# Patient Record
Sex: MALE | Race: BLACK OR AFRICAN AMERICAN | NOT HISPANIC OR LATINO
[De-identification: names, ages, dates, MRNs, and addresses within clinical notes are randomized per-mention and may not be internally consistent; named-entity substitution may affect disease eponyms.]

---

## 2024-01-10 ENCOUNTER — RESULT CHARGE (OUTPATIENT)
Age: 54
End: 2024-01-10

## 2024-01-11 ENCOUNTER — MED ADMIN CHARGE (OUTPATIENT)
Age: 54
End: 2024-01-11

## 2024-01-11 ENCOUNTER — APPOINTMENT (OUTPATIENT)
Dept: INTERNAL MEDICINE | Facility: CLINIC | Age: 54
End: 2024-01-11
Payer: MEDICAID

## 2024-01-11 ENCOUNTER — LABORATORY RESULT (OUTPATIENT)
Age: 54
End: 2024-01-11

## 2024-01-11 ENCOUNTER — NON-APPOINTMENT (OUTPATIENT)
Age: 54
End: 2024-01-11

## 2024-01-11 ENCOUNTER — OUTPATIENT (OUTPATIENT)
Dept: OUTPATIENT SERVICES | Facility: HOSPITAL | Age: 54
LOS: 1 days | End: 2024-01-11

## 2024-01-11 VITALS
DIASTOLIC BLOOD PRESSURE: 98 MMHG | WEIGHT: 192 LBS | HEIGHT: 66.54 IN | HEART RATE: 67 BPM | SYSTOLIC BLOOD PRESSURE: 152 MMHG | OXYGEN SATURATION: 97 % | BODY MASS INDEX: 30.49 KG/M2

## 2024-01-11 DIAGNOSIS — Z00.00 ENCOUNTER FOR GENERAL ADULT MEDICAL EXAMINATION W/OUT ABNORMAL FINDINGS: ICD-10-CM

## 2024-01-11 DIAGNOSIS — Z23 ENCOUNTER FOR IMMUNIZATION: ICD-10-CM

## 2024-01-11 DIAGNOSIS — R60.0 LOCALIZED EDEMA: ICD-10-CM

## 2024-01-11 DIAGNOSIS — Z82.49 FAMILY HISTORY OF ISCHEMIC HEART DISEASE AND OTHER DISEASES OF THE CIRCULATORY SYSTEM: ICD-10-CM

## 2024-01-11 PROCEDURE — G2211 COMPLEX E/M VISIT ADD ON: CPT | Mod: NC

## 2024-01-11 PROCEDURE — 99386 PREV VISIT NEW AGE 40-64: CPT | Mod: GC,25

## 2024-01-11 RX ORDER — ASPIRIN 81 MG/1
81 TABLET, CHEWABLE ORAL
Qty: 90 | Refills: 2 | Status: ACTIVE | COMMUNITY
Start: 2024-01-11 | End: 1900-01-01

## 2024-01-12 DIAGNOSIS — E78.5 HYPERLIPIDEMIA, UNSPECIFIED: ICD-10-CM

## 2024-01-12 DIAGNOSIS — I63.9 CEREBRAL INFARCTION, UNSPECIFIED: ICD-10-CM

## 2024-01-12 DIAGNOSIS — Z00.00 ENCOUNTER FOR GENERAL ADULT MEDICAL EXAMINATION WITHOUT ABNORMAL FINDINGS: ICD-10-CM

## 2024-01-12 DIAGNOSIS — I10 ESSENTIAL (PRIMARY) HYPERTENSION: ICD-10-CM

## 2024-01-12 PROBLEM — R60.0 LEG EDEMA: Status: ACTIVE | Noted: 2024-01-12

## 2024-01-12 LAB
ALBUMIN SERPL ELPH-MCNC: 4.6 G/DL
ALP BLD-CCNC: 95 U/L
ALT SERPL-CCNC: 17 U/L
ANION GAP SERPL CALC-SCNC: 13 MMOL/L
AST SERPL-CCNC: 24 U/L
BASOPHILS # BLD AUTO: 0.05 K/UL
BASOPHILS NFR BLD AUTO: 0.8 %
BILIRUB SERPL-MCNC: 0.6 MG/DL
BUN SERPL-MCNC: 10 MG/DL
CALCIUM SERPL-MCNC: 9.5 MG/DL
CHLORIDE SERPL-SCNC: 98 MMOL/L
CHOLEST SERPL-MCNC: 216 MG/DL
CO2 SERPL-SCNC: 28 MMOL/L
CREAT SERPL-MCNC: 1.19 MG/DL
EGFR: 72 ML/MIN/1.73M2
EOSINOPHIL # BLD AUTO: 0.08 K/UL
EOSINOPHIL NFR BLD AUTO: 1.3 %
ESTIMATED AVERAGE GLUCOSE: 120 MG/DL
GLUCOSE SERPL-MCNC: 81 MG/DL
HAV IGM SER QL: NONREACTIVE
HBA1C MFR BLD HPLC: 5.8 %
HBV CORE IGM SER QL: NONREACTIVE
HBV SURFACE AG SER QL: NONREACTIVE
HCT VFR BLD CALC: 47.1 %
HCV AB SER QL: NONREACTIVE
HCV S/CO RATIO: 0.2 S/CO
HDLC SERPL-MCNC: 43 MG/DL
HGB BLD-MCNC: 15.2 G/DL
IMM GRANULOCYTES NFR BLD AUTO: 0.5 %
LDLC SERPL CALC-MCNC: 148 MG/DL
LYMPHOCYTES # BLD AUTO: 2.06 K/UL
LYMPHOCYTES NFR BLD AUTO: 34.7 %
MAN DIFF?: NORMAL
MCHC RBC-ENTMCNC: 22.7 PG
MCHC RBC-ENTMCNC: 32.3 GM/DL
MCV RBC AUTO: 70.3 FL
MONOCYTES # BLD AUTO: 0.42 K/UL
MONOCYTES NFR BLD AUTO: 7.1 %
NEUTROPHILS # BLD AUTO: 3.3 K/UL
NEUTROPHILS NFR BLD AUTO: 55.6 %
NONHDLC SERPL-MCNC: 173 MG/DL
PLATELET # BLD AUTO: 213 K/UL
POTASSIUM SERPL-SCNC: 4.1 MMOL/L
PROT SERPL-MCNC: 7.7 G/DL
RBC # BLD: 6.7 M/UL
RBC # FLD: 16.8 %
SODIUM SERPL-SCNC: 139 MMOL/L
TRIGL SERPL-MCNC: 137 MG/DL
TSH SERPL-ACNC: 2.74 UIU/ML
WBC # FLD AUTO: 5.94 K/UL

## 2024-01-12 NOTE — ASSESSMENT
[FreeTextEntry1] :  This is a 54 y/o M with PMHx of CVA, HTN and HLD who presented to establish care.   Will obtain CBC, CMP, A1c, lipid panel, TSH, HIV, hepatitis panel, MMR titers and quantTB today.   HCM: Flu- will administer at today's visit 1/11 Tdap- will administer at today's visit 1/11 Colonoscopy: GI referral sent   Discussed w/ Dr. Elizondo.   RTC in 5 weeks for HTN management.   Nita Bentley PGY1 [employer] [facility]

## 2024-01-12 NOTE — REVIEW OF SYSTEMS
[Unsteady Walk] : ataxia [Fever] : no fever [Chills] : no chills [Night Sweats] : no night sweats [Recent Change In Weight] : ~T no recent weight change [Vision Problems] : no vision problems [Nasal Discharge] : no nasal discharge [Chest Pain] : no chest pain [Palpitations] : no palpitations [Shortness Of Breath] : no shortness of breath [Wheezing] : no wheezing [Cough] : no cough [Abdominal Pain] : no abdominal pain [Nausea] : no nausea [Vomiting] : no vomiting [Joint Pain] : no joint pain [Hematuria] : no hematuria [Back Pain] : no back pain [Skin Rash] : no skin rash [Headache] : no headache [FreeTextEntry6] : + 1-2x/month sudden onset of SOB that lasts 1-2 mins each time  [Dizziness] : no dizziness [de-identified] : +weakness in LE

## 2024-01-12 NOTE — PHYSICAL EXAM
[No Acute Distress] : no acute distress [Well Nourished] : well nourished [Well Developed] : well developed [Normal Sclera/Conjunctiva] : normal sclera/conjunctiva [PERRL] : pupils equal round and reactive to light [EOMI] : extraocular movements intact [Normal Outer Ear/Nose] : the outer ears and nose were normal in appearance [Normal Oropharynx] : the oropharynx was normal [No JVD] : no jugular venous distention [No Lymphadenopathy] : no lymphadenopathy [Supple] : supple [No Respiratory Distress] : no respiratory distress  [No Accessory Muscle Use] : no accessory muscle use [Clear to Auscultation] : lungs were clear to auscultation bilaterally [Normal Rate] : normal rate  [Regular Rhythm] : with a regular rhythm [Normal S1, S2] : normal S1 and S2 [No Murmur] : no murmur heard [Pedal Pulses Present] : the pedal pulses are present [Soft] : abdomen soft [Non Tender] : non-tender [Non-distended] : non-distended [No Spinal Tenderness] : no spinal tenderness [No Joint Swelling] : no joint swelling [Alert and Oriented x3] : oriented to person, place, and time [Normal Mood] : the mood was normal [de-identified] : 1+ pitting edema b/l, R>L  [de-identified] : 5/5 strength in b/l upper and lower extremities  [de-identified] : CN II-XII intact, normal finger to nose, difficulty w/ heel to shin (though may be due to language barrier), overall steady gait, difficulty w/ tandem walk; difficulty w/ word finding on several occasions

## 2024-01-12 NOTE — HISTORY OF PRESENT ILLNESS
[Friend] : friend [FreeTextEntry1] : ARELIS [de-identified] : This is a 52 y/o Creole-speaking M with PMHx of HTN, HLD, and CVA who presented to the clinic to establish care.  He reported a CV[place] in 6[place] of the [place]. He noted feeling dizzy, found his blood pressure to be high, and then developed RLE weakness and tongue heaviness. He went to the ED and was found to have a stroke. He was started on aspirin but stopped after he moved to the [facility] in 9/2023. He reported no f/u since CVA event. Today, patient reported continued difficulty w/ speech and unsteady gait. Also noted he has been less active since the stroke. Denied any dizziness, headaches, confusion, chest pain, sob, ab pain, n/v.  Patient measures his BP at home, noted values can range from 120-190s systolic. He is currently not on any hypertension medications. When his blood pressures are elevated, he develops tension and discomfort in the back of his head and neck. These symptoms most recently occurred 4 days ago and usually happens 1-2x/month.

## 2024-01-16 LAB
M TB IFN-G BLD-IMP: POSITIVE
MEV IGG FLD QL IA: >300 AU/ML
MEV IGG+IGM SER-IMP: POSITIVE
MUV AB SER-ACNC: POSITIVE
MUV IGG SER QL IA: >300 AU/ML
QUANTIFERON TB PLUS MITOGEN MINUS NIL: >10 IU/ML
QUANTIFERON TB PLUS NIL: 0.54 IU/ML
QUANTIFERON TB PLUS TB1 MINUS NIL: 3.3 IU/ML
QUANTIFERON TB PLUS TB2 MINUS NIL: 3.51 IU/ML
RUBV IGG FLD-ACNC: 28.6 INDEX
RUBV IGG SER-IMP: POSITIVE
VZV AB TITR SER: POSITIVE
VZV IGG SER IF-ACNC: 3835 INDEX

## 2024-02-06 ENCOUNTER — APPOINTMENT (OUTPATIENT)
Dept: INTERNAL MEDICINE | Facility: CLINIC | Age: 54
End: 2024-02-06
Payer: COMMERCIAL

## 2024-02-06 ENCOUNTER — OUTPATIENT (OUTPATIENT)
Dept: OUTPATIENT SERVICES | Facility: HOSPITAL | Age: 54
LOS: 1 days | End: 2024-02-06

## 2024-02-06 DIAGNOSIS — Z11.1 ENCOUNTER FOR SCREENING FOR RESPIRATORY TUBERCULOSIS: ICD-10-CM

## 2024-02-06 PROCEDURE — 99213 OFFICE O/P EST LOW 20 MIN: CPT | Mod: GE,25

## 2024-02-06 NOTE — PHYSICAL EXAM
[Normal] : normal sclera/conjunctiva, pupils are equal, round and reactive to light and extraocular movements are intact [No Respiratory Distress] : no respiratory distress  [No Accessory Muscle Use] : no accessory muscle use [de-identified] : ambulating without difficulty

## 2024-02-06 NOTE — HISTORY OF PRESENT ILLNESS
[Spouse] : spouse [FreeTextEntry1] : TB  [de-identified] : 52 yo Creole-speaking M with PMHx of HTN, HLD, CVA, and recent positive Quant Gold who presented to clinic for follow up. Patient is otherwise asymptomatic at this time - no fevers, chills or cough. Limited ROS and exam given nature of visit. Explained to patient need for CXR for diagnosis of latent vs active TB and future treatment. Wife present during visit. Given CXR referral as well as sheet for various [facility] imaging diagnostic offices and instructed to make appointment for imaging. Informed patient that follow up appointment can be made after CXR is performed so we can determine what therapy to start.

## 2024-02-06 NOTE — PLAN
[FreeTextEntry1] : 54 yo Creole-speaking M with PMHx of HTN, HLD, CVA, and recent positive Quant Gold who presented to clinic for follow up.   #Positive Quant Gold - CXR referral given in person to patient along with list of locations for radiology centers - pending CXR can consider whether to treat for latent vs active TB - patient has instructions for ED visit  Paula Chanel, PGY2 [employer] [facility]   Discussed w Dr. Sloan    RTC after CXR results are available.

## 2024-02-06 NOTE — INTERPRETER SERVICES
[[employer] ] : provided by [employer]   [Time Spent: ____ minutes] : Total time spent using  services: [unfilled] minutes. The patient's primary language is not English thus required  services. [Interpreters_IDNumber] : [id number] [TWNoteComboBox1] : Maria L

## 2024-02-07 ENCOUNTER — APPOINTMENT (OUTPATIENT)
Dept: RADIOLOGY | Facility: HOSPITAL | Age: 54
End: 2024-02-07

## 2024-02-07 DIAGNOSIS — Z11.1 ENCOUNTER FOR SCREENING FOR RESPIRATORY TUBERCULOSIS: ICD-10-CM

## 2024-02-07 PROCEDURE — 71046 X-RAY EXAM CHEST 2 VIEWS: CPT | Mod: 26

## 2024-02-09 RX ORDER — RIFAMPIN 300 MG/1
300 CAPSULE ORAL DAILY
Qty: 60 | Refills: 4 | Status: ACTIVE | COMMUNITY
Start: 2024-02-09 | End: 2024-07-08

## 2024-02-29 ENCOUNTER — EMERGENCY (EMERGENCY)
Facility: HOSPITAL | Age: 54
LOS: 1 days | Discharge: HOME | End: 2024-02-29
Attending: EMERGENCY MEDICINE | Admitting: EMERGENCY MEDICINE
Payer: COMMERCIAL

## 2024-02-29 VITALS
RESPIRATION RATE: 18 BRPM | OXYGEN SATURATION: 97 % | SYSTOLIC BLOOD PRESSURE: 213 MMHG | TEMPERATURE: 98 F | HEART RATE: 65 BPM | DIASTOLIC BLOOD PRESSURE: 133 MMHG

## 2024-02-29 LAB
ALBUMIN SERPL ELPH-MCNC: 4.1 G/DL — SIGNIFICANT CHANGE UP (ref 3.3–5)
ALP SERPL-CCNC: 114 U/L — SIGNIFICANT CHANGE UP (ref 40–120)
ALT FLD-CCNC: 17 U/L — SIGNIFICANT CHANGE UP (ref 4–41)
ANION GAP SERPL CALC-SCNC: 9 MMOL/L — SIGNIFICANT CHANGE UP (ref 7–14)
APTT BLD: 29.9 SEC — SIGNIFICANT CHANGE UP (ref 24.5–35.6)
AST SERPL-CCNC: 35 U/L — SIGNIFICANT CHANGE UP (ref 4–40)
BASOPHILS # BLD AUTO: 0.03 K/UL — SIGNIFICANT CHANGE UP (ref 0–0.2)
BASOPHILS NFR BLD AUTO: 0.5 % — SIGNIFICANT CHANGE UP (ref 0–2)
BILIRUB SERPL-MCNC: 0.3 MG/DL — SIGNIFICANT CHANGE UP (ref 0.2–1.2)
BUN SERPL-MCNC: 11 MG/DL — SIGNIFICANT CHANGE UP (ref 7–23)
CALCIUM SERPL-MCNC: 8.9 MG/DL — SIGNIFICANT CHANGE UP (ref 8.4–10.5)
CHLORIDE SERPL-SCNC: 102 MMOL/L — SIGNIFICANT CHANGE UP (ref 98–107)
CO2 SERPL-SCNC: 28 MMOL/L — SIGNIFICANT CHANGE UP (ref 22–31)
CREAT SERPL-MCNC: 1.04 MG/DL — SIGNIFICANT CHANGE UP (ref 0.5–1.3)
EGFR: 85 ML/MIN/1.73M2 — SIGNIFICANT CHANGE UP
EOSINOPHIL # BLD AUTO: 0.24 K/UL — SIGNIFICANT CHANGE UP (ref 0–0.5)
EOSINOPHIL NFR BLD AUTO: 3.9 % — SIGNIFICANT CHANGE UP (ref 0–6)
GLUCOSE SERPL-MCNC: 83 MG/DL — SIGNIFICANT CHANGE UP (ref 70–99)
HCT VFR BLD CALC: 44.8 % — SIGNIFICANT CHANGE UP (ref 39–50)
HGB BLD-MCNC: 14 G/DL — SIGNIFICANT CHANGE UP (ref 13–17)
IANC: 3.27 K/UL — SIGNIFICANT CHANGE UP (ref 1.8–7.4)
IMM GRANULOCYTES NFR BLD AUTO: 0.5 % — SIGNIFICANT CHANGE UP (ref 0–0.9)
INR BLD: 0.99 RATIO — SIGNIFICANT CHANGE UP (ref 0.85–1.18)
LYMPHOCYTES # BLD AUTO: 2.15 K/UL — SIGNIFICANT CHANGE UP (ref 1–3.3)
LYMPHOCYTES # BLD AUTO: 34.7 % — SIGNIFICANT CHANGE UP (ref 13–44)
MCHC RBC-ENTMCNC: 21.9 PG — LOW (ref 27–34)
MCHC RBC-ENTMCNC: 31.3 GM/DL — LOW (ref 32–36)
MCV RBC AUTO: 70.2 FL — LOW (ref 80–100)
MONOCYTES # BLD AUTO: 0.47 K/UL — SIGNIFICANT CHANGE UP (ref 0–0.9)
MONOCYTES NFR BLD AUTO: 7.6 % — SIGNIFICANT CHANGE UP (ref 2–14)
NEUTROPHILS # BLD AUTO: 3.27 K/UL — SIGNIFICANT CHANGE UP (ref 1.8–7.4)
NEUTROPHILS NFR BLD AUTO: 52.8 % — SIGNIFICANT CHANGE UP (ref 43–77)
NRBC # BLD: 0 /100 WBCS — SIGNIFICANT CHANGE UP (ref 0–0)
NRBC # FLD: 0 K/UL — SIGNIFICANT CHANGE UP (ref 0–0)
NT-PROBNP SERPL-SCNC: <36 PG/ML — SIGNIFICANT CHANGE UP
PLATELET # BLD AUTO: 239 K/UL — SIGNIFICANT CHANGE UP (ref 150–400)
POTASSIUM SERPL-MCNC: 3.7 MMOL/L — SIGNIFICANT CHANGE UP (ref 3.5–5.3)
POTASSIUM SERPL-SCNC: 3.7 MMOL/L — SIGNIFICANT CHANGE UP (ref 3.5–5.3)
PROT SERPL-MCNC: 7.6 G/DL — SIGNIFICANT CHANGE UP (ref 6–8.3)
PROTHROM AB SERPL-ACNC: 11.2 SEC — SIGNIFICANT CHANGE UP (ref 9.5–13)
RBC # BLD: 6.38 M/UL — HIGH (ref 4.2–5.8)
RBC # FLD: 16.1 % — HIGH (ref 10.3–14.5)
SODIUM SERPL-SCNC: 139 MMOL/L — SIGNIFICANT CHANGE UP (ref 135–145)
TROPONIN T, HIGH SENSITIVITY RESULT: 12 NG/L — SIGNIFICANT CHANGE UP
WBC # BLD: 6.19 K/UL — SIGNIFICANT CHANGE UP (ref 3.8–10.5)
WBC # FLD AUTO: 6.19 K/UL — SIGNIFICANT CHANGE UP (ref 3.8–10.5)

## 2024-02-29 PROCEDURE — 99285 EMERGENCY DEPT VISIT HI MDM: CPT

## 2024-02-29 PROCEDURE — 93010 ELECTROCARDIOGRAM REPORT: CPT

## 2024-02-29 RX ORDER — AMLODIPINE BESYLATE 2.5 MG/1
5 TABLET ORAL ONCE
Refills: 0 | Status: COMPLETED | OUTPATIENT
Start: 2024-02-29 | End: 2024-02-29

## 2024-02-29 NOTE — ED ADULT NURSE NOTE - OBJECTIVE STATEMENT
receive pt TRB A&O2 to self and place, RR even unlabored completing full sentences. pt c/o HTN x 1 week. pt endorses compliance with medication. pt also noted to have L sided lip swelling, denies pain to area, airway patent. per wife at bedside pt confused at baseline. past medical hx CVA May 2023, HTN. denies h/a, dizziness/lightheadedness, abd pain, n/v/d, fevers/chills, sob, cp, gu sx. pt well appearing on assessment. NSR CM noted. 20gIV placed RAC, labs collected and sent as ordered. stretcher lowest position siderails up safety measures maintained.

## 2024-02-29 NOTE — ED ADULT TRIAGE NOTE - CHIEF COMPLAINT QUOTE
pt to ED c/o HTN x 1 week, compliant with medication, pt noted to have L sided lip swelling, airway patent, NKDA. per wife pt confused at baseline, denies any medical complaints. phx: CVA May 2023, HTN

## 2024-02-29 NOTE — ED PROVIDER NOTE - PATIENT PORTAL LINK FT
You can access the FollowMyHealth Patient Portal offered by [facility] by registering at the following website: [email or web address]. By joining [employer]’s FollowMyHealth portal, you will also be able to view your health information using other applications (apps) compatible with our system.

## 2024-02-29 NOTE — ED PROVIDER NOTE - PROGRESS NOTE DETAILS
pt labs wnl. pt given a dose of norvasc. Minesh, PGY-3, EM: pt received a dose of hydralazine IV due to persistently elevated diastolic BP. BP improved to 157/99. will likely dc w/ PMD follow up to have his BP rechecked and medications adjusted as needed. Minesh, PGY-3, EM:  Discussed with pt results of work up, strict return precautions, and need for follow up.  Pt expressed understanding and agrees with plan.  ID: Minesh, PGY-3, EM:  Discussed with pt results of work up, strict return precautions, and need for follow up.  Pt expressed understanding and agrees with plan.  ID: [id number]

## 2024-02-29 NOTE — ED ADULT NURSE NOTE - NSFALLUNIVINTERV_ED_ALL_ED
Bed/Stretcher in lowest position, wheels locked, appropriate side rails in place/Call bell, personal items and telephone in reach/Instruct patient to call for assistance before getting out of bed/chair/stretcher/Non-slip footwear applied when patient is off stretcher/[place] to call system/Physically safe environment - no spills, clutter or unnecessary equipment/Purposeful proactive rounding/Room/bathroom lighting operational, light cord in reach

## 2024-02-29 NOTE — ED PROVIDER NOTE - CLINICAL SUMMARY MEDICAL DECISION MAKING FREE TEXT BOX
[facility] Creole  ID #[id number] Jodie. 53-year-old male with history of stroke w/residual memory deficits, HTN, presenting with high blood pressure and lip swelling.  Patient states over the last week he has had elevated blood pressures to the 160s to 170s systolic, today had episode of lower lip swelling, checked BP at home at that time which was 200/110  per wife at bedside,  so patient came to ED for evaluation.  Has been compliant with losartan 25 Mg daily and amlodipine 5 mg daily, no missed medications, no recent medication changes.  Patient also has had never had lower lip swelling before, denies pain/itching/rash, no new soaps or lotions or Chapstick's, no new food exposures, denies throat swelling, trouble breathing/speaking/swallowing.   Currently denies dizziness/lightheadedness, visual/hearing changes, HA, SOB, CP, fever/chills, palpitations, back pain, ABD pain, N/V/D/C,  numbness/tingling, focal weakness.  Patient reports history of stroke in the past secondary to high blood pressure, cannot tell me whether it was due to a bleed or clot, on aspirin 81 mg daily.     Gen: AAOx3, non-toxic  Head: NCAT  HEENT: EOMI, oral mucosa dry, normal conjunctiva; +mild lower lip L-sided edema w/o rash, nontender, no posterior oropharyngeal edema/erythema/exudates  Lung: CTAB, no respiratory distress, no wheezes/rhonchi/rales B/L, speaking in full sentences  CV: RRR, no murmurs, rubs or gallops  Abd: soft, NTND, no guarding, no CVA tenderness  MSK: no visible deformities  Neuro: No focal sensory or motor deficits  Skin: Warm, well perfused, no rash, no edema; 2+ peripheral pulses symmetric b/l   Psych: pleasant, normal affect.       On arrival vital signs notable for systolic BP in 200s, diastolic BP over 100, patient well-appearing at this time however will eval for hypertensive emergency with labs including EKG, troponin,  BNP, chest x-ray, treat elevated blood pressure with additional dose of amlodipine, and reassess; dispo likely to be DC home with PCP follow-up pending workup.  This represents an initial assessment; workup and plan subject to change. - Teodoro Serrano, PGY-3 [facility] Creole  ID #[id number] Jodie. 53-year-old male with history of stroke w/residual memory deficits, HTN, presenting with high blood pressure and lip swelling.  Patient states over the last week he has had elevated blood pressures to the 160s to 170s systolic, today had episode of lower lip swelling, checked BP at home at that time which was 200/110  per wife at bedside,  so patient came to ED for evaluation.  Has been compliant with losartan 25 Mg daily and amlodipine 5 mg daily, no missed medications, no recent medication changes.  Patient also has had never had lower lip swelling before, denies pain/itching/rash, no new soaps or lotions or Chapstick's, no new food exposures, denies throat swelling, trouble breathing/speaking/swallowing.   Currently denies dizziness/lightheadedness, visual/hearing changes, HA, SOB, CP, fever/chills, palpitations, back pain, ABD pain, N/V/D/C,  numbness/tingling, focal weakness.  Patient reports history of stroke in the past secondary to high blood pressure, cannot tell me whether it was due to a bleed or clot, on aspirin 81 mg daily.     Gen: AAOx3, non-toxic  Head: NCAT  HEENT: EOMI, oral mucosa dry, normal conjunctiva; +mild lower lip L-sided edema w/o rash, nontender, no posterior oropharyngeal edema/erythema/exudates  Lung: CTAB, no respiratory distress, no wheezes/rhonchi/rales B/L, speaking in full sentences  CV: RRR, no murmurs, rubs or gallops  Abd: soft, NTND, no guarding, no CVA tenderness  MSK: no visible deformities  Neuro: No focal sensory or motor deficits  Skin: Warm, well perfused, no rash, no edema; 2+ peripheral pulses symmetric b/l   Psych: pleasant, normal affect.       On arrival vital signs notable for systolic BP in 200s, diastolic BP over 100, patient well-appearing at this time however will eval for hypertensive emergency with labs including EKG, troponin,  BNP, chest x-ray, treat elevated blood pressure with additional dose of amlodipine, and reassess; dispo likely to be DC home with PCP follow-up pending workup.  This represents an initial assessment; workup and plan subject to change. Patient currently has follow-up appoint with PCP scheduled for March 21, advised to move up if possible. - Teodoro Serrano, PGY-3

## 2024-02-29 NOTE — ED PROVIDER NOTE - NSFOLLOWUPINSTRUCTIONS_ED_ALL_ED_FT
Please follow up with your doctor within 1 week for further management of your HIGH BLOOD PRESSURE. Bring copies of your results with you (provided in your discharge paperwork). Please stay well-hydrated and take all medications as previously prescribed.    You may take 500-1000 mg acetaminophen (tylenol) every 6 hours, as needed for pain.  You may take 600 mg ibuprofen every 8 hours, with food, as needed for pain.  You can take tylenol and ibuprofen at the same time.     Contact a doctor if:  You think you are having a reaction to the medicine you are taking.  You have headaches that keep coming back.  You feel dizzy.  You have swelling in your ankles.  You have trouble with your vision.    Get help right away if:  You get a very bad headache.  You start to feel mixed up (confused).  You feel weak or numb.  You feel faint.  You have very bad pain in your:  Chest.  Belly (abdomen).  You vomit more than once.  You have trouble breathing.    These symptoms may be an emergency. Get help right away. Call 911.  Do not wait to see if the symptoms will go away.  Do not drive yourself to the hospital.

## 2024-02-29 NOTE — ED PROVIDER NOTE - ATTENDING CONTRIBUTION TO CARE
Attending Statement: I have personally seen and examined this patient. I have fully participated in the care of this patient. I have reviewed all pertinent clinical information, including history physical exam, plan and the Resident's note and agree except as noted  53-year-old male history of hypertension on losartan and Norvasc, prior CVA in May 2023 with memory problems since no other residuals from home chief complaint of elevated blood pressure for 1 week.  Patient with wife at bedside states he is adherent to his medication but has noticed that his blood pressure has been persistently systolic in the 180s this week, and this afternoon around 5 PM wife noticed that his lower lip on the the left side was swollen prompting ER visit.  Denies any difficulty speaking or swallowing.  Denies any hives denies any swelling to the tongue or posterior oropharynx.  No new medications no new foods not on an ACE inhibitor.  No rashes.  Since then swelling of the lip has decreased significantly as per the wife, but the pressure was over 200 at home prompted ER visit.  Patient states "I feel fine" denies any chest pain, shortness of breath, cough, fever denies feeling nauseous denies headache denies change in vision denies any focal numbness or weakness.  No falls no trauma.  Vitals noted he is hypertensive.  Well-appearing male laying in bed ANO x 3 via iPad  for Creole.  No facial asymmetry no slurred speech no hives no rashes no drooling no stridor.  Very mild isolated swelling to the lateral left lower lip.  No swelling to the upper lip, tongue or floor the mouth.  Handling secretions well.  Supple neck.  No respiratory distress nontender abdomen no focal neurologic deficits.  No photophobia laying in bed with the lights are on supple neck  Plan labs, urine, EKG and monitor blood pressure.  pt neurologically intact. no resp distress

## 2024-03-01 ENCOUNTER — NON-APPOINTMENT (OUTPATIENT)
Age: 54
End: 2024-03-01

## 2024-03-01 VITALS
RESPIRATION RATE: 16 BRPM | HEART RATE: 68 BPM | DIASTOLIC BLOOD PRESSURE: 99 MMHG | OXYGEN SATURATION: 100 % | SYSTOLIC BLOOD PRESSURE: 157 MMHG

## 2024-03-01 LAB
APPEARANCE UR: CLEAR — SIGNIFICANT CHANGE UP
BILIRUB UR-MCNC: NEGATIVE — SIGNIFICANT CHANGE UP
COLOR SPEC: YELLOW — SIGNIFICANT CHANGE UP
DIFF PNL FLD: NEGATIVE — SIGNIFICANT CHANGE UP
GLUCOSE UR QL: NEGATIVE MG/DL — SIGNIFICANT CHANGE UP
KETONES UR-MCNC: NEGATIVE MG/DL — SIGNIFICANT CHANGE UP
LEUKOCYTE ESTERASE UR-ACNC: NEGATIVE — SIGNIFICANT CHANGE UP
NITRITE UR-MCNC: NEGATIVE — SIGNIFICANT CHANGE UP
PH UR: 7 — SIGNIFICANT CHANGE UP (ref 5–8)
PROT UR-MCNC: NEGATIVE MG/DL — SIGNIFICANT CHANGE UP
SP GR SPEC: 1.01 — SIGNIFICANT CHANGE UP (ref 1–1.03)
TROPONIN T, HIGH SENSITIVITY RESULT: 14 NG/L — SIGNIFICANT CHANGE UP
UROBILINOGEN FLD QL: 0.2 MG/DL — SIGNIFICANT CHANGE UP (ref 0.2–1)

## 2024-03-01 PROCEDURE — 71046 X-RAY EXAM CHEST 2 VIEWS: CPT | Mod: 26

## 2024-03-01 RX ORDER — HYDRALAZINE HCL 50 MG
10 TABLET ORAL ONCE
Refills: 0 | Status: COMPLETED | OUTPATIENT
Start: 2024-03-01 | End: 2024-03-01

## 2024-03-01 RX ADMIN — Medication 10 MILLIGRAM(S): at 01:25

## 2024-03-01 RX ADMIN — AMLODIPINE BESYLATE 5 MILLIGRAM(S): 2.5 TABLET ORAL at 00:28

## 2024-03-01 NOTE — ED ADULT NURSE REASSESSMENT NOTE - NS ED NURSE REASSESS COMMENT FT1
pt remains at baseline mental status, RR even unlabored completing full sentences. pt resting in stretcher comfortably at this time, no new complaints offered. rpt VS per chart, MD aware. urine collected and sent as ordered. stretcher lowest position siderails up safety measures in place.

## 2024-03-06 ENCOUNTER — OUTPATIENT (OUTPATIENT)
Dept: OUTPATIENT SERVICES | Facility: HOSPITAL | Age: 54
LOS: 1 days | End: 2024-03-06

## 2024-03-06 ENCOUNTER — APPOINTMENT (OUTPATIENT)
Dept: INTERNAL MEDICINE | Facility: CLINIC | Age: 54
End: 2024-03-06
Payer: COMMERCIAL

## 2024-03-06 VITALS
WEIGHT: 205 LBS | HEART RATE: 79 BPM | RESPIRATION RATE: 18 BRPM | DIASTOLIC BLOOD PRESSURE: 140 MMHG | TEMPERATURE: 98.2 F | HEIGHT: 66.54 IN | BODY MASS INDEX: 32.56 KG/M2 | OXYGEN SATURATION: 100 % | SYSTOLIC BLOOD PRESSURE: 210 MMHG

## 2024-03-06 VITALS — SYSTOLIC BLOOD PRESSURE: 195 MMHG | DIASTOLIC BLOOD PRESSURE: 135 MMHG

## 2024-03-06 DIAGNOSIS — E78.5 HYPERLIPIDEMIA, UNSPECIFIED: ICD-10-CM

## 2024-03-06 PROBLEM — I10 ESSENTIAL (PRIMARY) HYPERTENSION: Chronic | Status: ACTIVE | Noted: 2024-02-29

## 2024-03-06 PROBLEM — I63.9 CEREBRAL INFARCTION, UNSPECIFIED: Chronic | Status: ACTIVE | Noted: 2024-02-29

## 2024-03-06 PROCEDURE — 99213 OFFICE O/P EST LOW 20 MIN: CPT | Mod: GE

## 2024-03-06 NOTE — PHYSICAL EXAM
[No Acute Distress] : no acute distress [PERRL] : pupils equal round and reactive to light [Well Nourished] : well nourished [EOMI] : extraocular movements intact [No JVD] : no jugular venous distention [Supple] : supple [No Respiratory Distress] : no respiratory distress  [Clear to Auscultation] : lungs were clear to auscultation bilaterally [Normal Rate] : normal rate  [Regular Rhythm] : with a regular rhythm [No Murmur] : no murmur heard [No Edema] : there was no peripheral edema [Soft] : abdomen soft [Non-distended] : non-distended [Non Tender] : non-tender [No Spinal Tenderness] : no spinal tenderness [No CVA Tenderness] : no CVA  tenderness [No Joint Swelling] : no joint swelling [Grossly Normal Strength/Tone] : grossly normal strength/tone [No Rash] : no rash [No Focal Deficits] : no focal deficits [Normal Insight/Judgement] : insight and judgment were intact [Normal Affect] : the affect was normal

## 2024-03-11 ENCOUNTER — RX RENEWAL (OUTPATIENT)
Age: 54
End: 2024-03-11

## 2024-03-11 DIAGNOSIS — E78.5 HYPERLIPIDEMIA, UNSPECIFIED: ICD-10-CM

## 2024-03-11 DIAGNOSIS — I10 ESSENTIAL (PRIMARY) HYPERTENSION: ICD-10-CM

## 2024-03-11 DIAGNOSIS — I63.9 CEREBRAL INFARCTION, UNSPECIFIED: ICD-10-CM

## 2024-03-11 RX ORDER — ATORVASTATIN CALCIUM 40 MG/1
40 TABLET, FILM COATED ORAL
Qty: 30 | Refills: 3 | Status: ACTIVE | COMMUNITY
Start: 2024-01-11 | End: 1900-01-01

## 2024-03-11 NOTE — HISTORY OF PRESENT ILLNESS
[FreeTextEntry1] : Hypertension  [de-identified] : 52 yo Creole-speaking M with PMHx of HTN, HLD, CVA, latent TB presenting for a post-ED visit for uncontrolled hypertension.   The patient was recently seen in the ED due to hypertensive urgency w/ systolic BPs in 200s. The patient was otherwise asymptomatic and labs were unremarkable so patient was advised to follow up with PCP. Dr. Shaw called the patient and recommended increase in his losartan from 25 --> 50 mg.  The patient takes his BP at home daily, has been getting readings for systolics between 170s-200s despite medication regimen change. the patient reports that he has continued to be asymptomatic w/o any chest pain, shortness of breath, blurry vision, headaches, nausea, vomiting. The patient has been taking all of his medications and has not missed any doses. He reports that he has had a long hx of hypertension since 2016, and it has been refractory to medications w/ worsening whenever he runs out of meds.

## 2024-03-11 NOTE — ASSESSMENT
[FreeTextEntry1] : 54 yo Creole-speaking M with PMHx of HTN, HLD, CVA, latent TB presenting for a post-ED visit for uncontrolled hypertension.   #Hypertension Persistently uncontrolled w/ BPs 190s-200s systolics w/ high diastolic to 130s-140s. Patient remains asymptomatic. Appears that patient has longstanding uncontrolled essential hypertension likely resistant to current medication regimen.  - Increase losartan 100 mg QD - Continue amlodipine 5 mg QD - Start chlorthalidone 12.5 mg QD - Recommend repeat visit in 1 week w/ nursing visit for CMP check prior in order to titrate medications for uncontrolled hypertension   #Latent TB Positive Quant Gold, CXR clear.  - Currently on Rifampin 300 mg for 4 months  - Repeat CMP as above   #Hx CVA Hx of CVA in 6/2023 w/ residual expressive aphasia as well as weakness in RLE  - Continue atorvastatin 40 mg  - Continue aspirin 81 mg QD  RTC in 1 week for follow up on hypertension w/ CMP one day before to check K and LFTs   Case Discussed w/ Dr. Laurence Orr MD PGY-3 Internal Medicine Medical Specialties at [facility]  [employer] [facility]

## 2024-03-21 ENCOUNTER — APPOINTMENT (OUTPATIENT)
Dept: INTERNAL MEDICINE | Facility: CLINIC | Age: 54
End: 2024-03-21
Payer: COMMERCIAL

## 2024-03-21 ENCOUNTER — OUTPATIENT (OUTPATIENT)
Dept: OUTPATIENT SERVICES | Facility: HOSPITAL | Age: 54
LOS: 1 days | End: 2024-03-21

## 2024-03-21 ENCOUNTER — LABORATORY RESULT (OUTPATIENT)
Age: 54
End: 2024-03-21

## 2024-03-21 VITALS
OXYGEN SATURATION: 97 % | HEIGHT: 66.54 IN | WEIGHT: 205 LBS | BODY MASS INDEX: 32.56 KG/M2 | DIASTOLIC BLOOD PRESSURE: 90 MMHG | SYSTOLIC BLOOD PRESSURE: 160 MMHG | HEART RATE: 80 BPM | RESPIRATION RATE: 16 BRPM

## 2024-03-21 DIAGNOSIS — I10 ESSENTIAL (PRIMARY) HYPERTENSION: ICD-10-CM

## 2024-03-21 DIAGNOSIS — I63.9 CEREBRAL INFARCTION, UNSPECIFIED: ICD-10-CM

## 2024-03-21 DIAGNOSIS — Z22.7 LATENT TUBERCULOSIS: ICD-10-CM

## 2024-03-21 PROCEDURE — 99214 OFFICE O/P EST MOD 30 MIN: CPT | Mod: GC

## 2024-03-21 RX ORDER — CHLORTHALIDONE 25 MG/1
25 TABLET ORAL DAILY
Qty: 30 | Refills: 1 | Status: ACTIVE | COMMUNITY
Start: 2024-03-06 | End: 1900-01-01

## 2024-03-22 PROBLEM — Z22.7 LATENT TUBERCULOSIS INFECTION: Status: ACTIVE | Noted: 2024-02-09

## 2024-03-22 PROBLEM — I63.9 CVA (CEREBRAL VASCULAR ACCIDENT): Status: ACTIVE | Noted: 2024-01-11

## 2024-03-22 LAB
ALBUMIN SERPL ELPH-MCNC: 4.6 G/DL
ALP BLD-CCNC: 110 U/L
ALT SERPL-CCNC: 29 U/L
ANION GAP SERPL CALC-SCNC: 15 MMOL/L
AST SERPL-CCNC: 35 U/L
BASOPHILS # BLD AUTO: 0.04 K/UL
BASOPHILS NFR BLD AUTO: 0.7 %
BILIRUB SERPL-MCNC: 0.3 MG/DL
BUN SERPL-MCNC: 10 MG/DL
CALCIUM SERPL-MCNC: 9.6 MG/DL
CHLORIDE SERPL-SCNC: 98 MMOL/L
CO2 SERPL-SCNC: 25 MMOL/L
CREAT SERPL-MCNC: 1.13 MG/DL
EGFR: 77 ML/MIN/1.73M2
EOSINOPHIL # BLD AUTO: 0.12 K/UL
EOSINOPHIL NFR BLD AUTO: 2.1 %
GLUCOSE SERPL-MCNC: 98 MG/DL
HCT VFR BLD CALC: 47.1 %
HGB BLD-MCNC: 14.9 G/DL
IMM GRANULOCYTES NFR BLD AUTO: 0.5 %
LYMPHOCYTES # BLD AUTO: 1.95 K/UL
LYMPHOCYTES NFR BLD AUTO: 33.7 %
MAN DIFF?: NORMAL
MCHC RBC-ENTMCNC: 21.8 PG
MCHC RBC-ENTMCNC: 31.6 GM/DL
MCV RBC AUTO: 69 FL
MONOCYTES # BLD AUTO: 0.47 K/UL
MONOCYTES NFR BLD AUTO: 8.1 %
NEUTROPHILS # BLD AUTO: 3.18 K/UL
NEUTROPHILS NFR BLD AUTO: 54.9 %
PLATELET # BLD AUTO: 185 K/UL
POTASSIUM SERPL-SCNC: 3.5 MMOL/L
PROT SERPL-MCNC: 7.8 G/DL
RBC # BLD: 6.83 M/UL
RBC # FLD: 18.1 %
SODIUM SERPL-SCNC: 138 MMOL/L
WBC # FLD AUTO: 5.79 K/UL

## 2024-04-21 NOTE — ASSESSMENT
[FreeTextEntry1] : This is 52 y/o M w/ PMHx of HTN, HLD, CVA, latent TB on rifampin who presented for BP management.   HCM:  flu: UTD Tdap: UTD Shingles: defer  colon ca: upcoming visit w/ GI in 5/2024   RTC in 5 weeks for hypertension.   Discussed w/ Dr. Britt.   Nita Bentley PGY1 [employer]

## 2024-04-21 NOTE — HISTORY OF PRESENT ILLNESS
[Spouse] : spouse [FreeTextEntry1] : f/u on HTN  [de-identified] : This is 54 y/o Creole-speaking male w/ PMHx of HTN, HLD, CVA, latent TB on rifampin who presented for BP management.   Patient last seen in clinic 2 weeks ago for continued uncontrolled hypertension. Patient reported systolic BP measurements to range from 150-180s. He noted his BP meds include amlodipine 5 mg daily and losartan 100mg daily. He was prescribed chlorthalidone 12.5mg qd last visit but has not started taking it. Denied any symptoms such as chest pain, sob, blurry vision, headaches, nausea, vomiting.   Patient also continued to endorse occasional heaviness in R leg since his stroke. Has not worked w/ PT.

## 2024-04-21 NOTE — END OF VISIT
[] : Resident [FreeTextEntry3] : 54 y/o M w/ PMHx of HTN, HLD, CVA, latent TB on rifampin who presented for BP management.   BP Better control of BP in the past, currently on losartan and amlodipine. Will add low dose clorthalidone and reassess in 5 weeks.  -Pt instructed to keep BP log -Strict return precautions given  -Reassess BP in 5 weeks on new regimen    HCM:  flu: UTD Tdap: UTD Shingles: defer  colon ca: upcoming visit w/ GI in 5/2024   RTC in 5 weeks for hypertension.

## 2024-04-21 NOTE — REVIEW OF SYSTEMS
[Fever] : no fever [Chills] : no chills [Vision Problems] : no vision problems [Nasal Discharge] : no nasal discharge [Chest Pain] : no chest pain [Palpitations] : no palpitations [Shortness Of Breath] : no shortness of breath [Wheezing] : no wheezing [Cough] : no cough [Abdominal Pain] : no abdominal pain [Nausea] : no nausea [Vomiting] : no vomiting [Dysuria] : no dysuria [Joint Pain] : no joint pain [Back Pain] : no back pain [Skin Rash] : no skin rash [Unsteady Walk] : no ataxia

## 2024-04-21 NOTE — INTERPRETER SERVICES
[[employer] ] : provided by [employer]   [Interpreters_IDNumber] : [id number] [Interpreters_FullName] : Evens

## 2024-04-25 ENCOUNTER — APPOINTMENT (OUTPATIENT)
Dept: INTERNAL MEDICINE | Facility: CLINIC | Age: 54
End: 2024-04-25
Payer: COMMERCIAL

## 2024-04-25 ENCOUNTER — OUTPATIENT (OUTPATIENT)
Dept: OUTPATIENT SERVICES | Facility: HOSPITAL | Age: 54
LOS: 1 days | End: 2024-04-25

## 2024-04-25 VITALS
SYSTOLIC BLOOD PRESSURE: 160 MMHG | WEIGHT: 209.4 LBS | HEIGHT: 66 IN | BODY MASS INDEX: 33.65 KG/M2 | DIASTOLIC BLOOD PRESSURE: 100 MMHG | HEART RATE: 80 BPM | OXYGEN SATURATION: 97 %

## 2024-04-25 DIAGNOSIS — I10 ESSENTIAL (PRIMARY) HYPERTENSION: ICD-10-CM

## 2024-04-25 PROCEDURE — 99213 OFFICE O/P EST LOW 20 MIN: CPT | Mod: GE

## 2024-04-25 RX ORDER — AMLODIPINE BESYLATE 5 MG/1
5 TABLET ORAL
Qty: 30 | Refills: 3 | Status: DISCONTINUED | COMMUNITY
Start: 2024-03-01 | End: 2024-04-25

## 2024-04-25 RX ORDER — AMLODIPINE BESYLATE 10 MG/1
10 TABLET ORAL
Qty: 90 | Refills: 0 | Status: ACTIVE | COMMUNITY
Start: 2024-04-25 | End: 1900-01-01

## 2024-04-25 NOTE — INTERPRETER SERVICES
[[employer] ] : provided by [employer]   [Time Spent: ____ minutes] : Total time spent using  services: [unfilled] minutes. The patient's primary language is not English thus required  services. [Interpreters_IDNumber] : [id number] [Interpreters_FullName] : Suyapa

## 2024-04-25 NOTE — END OF VISIT
[FreeTextEntry3] : Pt needs aggressive BP med titration, to get to goal, likely needs 3 meds. Need to identify what other "med" pt is taking. Would think about secondary causes (PABLO, Etoh) and have low threshold to treat for hyper Matt is BP not controlled with 3 meds.  [] : Resident

## 2024-04-25 NOTE — ASSESSMENT
[FreeTextEntry1] : This is 52 y/o M w/ PMHx of HTN, HLD, CVA, latent TB on rifampin who presented for BP management.  HCM: flu: UTD Tdap: UTD Shingles: defer colon ca: upcoming visit w/ GI in 5/2024  RTC in 5 weeks for hypertension and to complete work forms.  At next visit, will conduct STOP BANG screen and also collect iron studies and hemoglobin electrophoresis (normal hgb but low MCV and elevated RDW).  Discussed w/ Dr. Fontenot.   Nita Bentley PGY1 [employer].

## 2024-04-25 NOTE — HISTORY OF PRESENT ILLNESS
[FreeTextEntry1] : HTN  [de-identified] : 52 y/o Creole-speaking male w/ PMHx of HTN, HLD, CVA, latent TB on rifampin who presented for BP management. Patient last seen in clinic 5 weeks ago. At that visit, patient was started on chlorthalidone 12.5mg qd in addition to losartan and amlodipine that he was already taking. Today, patient reported BPs in the 130-80s 3x/week and high 160s/90s for the rest of the week. He noted taking losartan 100mg qd and amlodipine 5mg qd. Patient did not recall taking chlorthalidone. Patient reported eating a low salt diet. He also noted taking a medication twice a daily to help him lose weight but unable to elaborate further. Wife on the phone also did not know.  Denied any symptoms such as dizziness, lightheadedness, syncopal episodes, chest pain, sob, blurry vision, headaches, n/v, lower extremity swelling.

## 2024-04-25 NOTE — PHYSICAL EXAM
[No Acute Distress] : no acute distress [Well-Appearing] : well-appearing [No JVD] : no jugular venous distention [No Lymphadenopathy] : no lymphadenopathy [Supple] : supple [No Respiratory Distress] : no respiratory distress  [No Accessory Muscle Use] : no accessory muscle use [Clear to Auscultation] : lungs were clear to auscultation bilaterally [Normal Rate] : normal rate  [Regular Rhythm] : with a regular rhythm [Normal S1, S2] : normal S1 and S2 [No Murmur] : no murmur heard [Normal] : normal rate, regular rhythm, normal S1 and S2 and no murmur heard [Pedal Pulses Present] : the pedal pulses are present [No Extremity Clubbing/Cyanosis] : no extremity clubbing/cyanosis [Soft] : abdomen soft [Non Tender] : non-tender [Non-distended] : non-distended [Grossly Normal Strength/Tone] : grossly normal strength/tone [No Rash] : no rash [Coordination Grossly Intact] : coordination grossly intact [No Focal Deficits] : no focal deficits [Alert and Oriented x3] : oriented to person, place, and time

## 2024-04-25 NOTE — REVIEW OF SYSTEMS
[Fever] : no fever [Chills] : no chills [Fatigue] : no fatigue [Vision Problems] : no vision problems [Sore Throat] : no sore throat [Chest Pain] : no chest pain [Palpitations] : no palpitations [Shortness Of Breath] : no shortness of breath [Wheezing] : no wheezing [Cough] : no cough [Abdominal Pain] : no abdominal pain [Nausea] : no nausea [Vomiting] : no vomiting [Dysuria] : no dysuria [Joint Pain] : no joint pain [Back Pain] : no back pain [Skin Rash] : no skin rash [Headache] : no headache [Dizziness] : no dizziness [Fainting] : no fainting

## 2024-05-23 ENCOUNTER — OUTPATIENT (OUTPATIENT)
Dept: OUTPATIENT SERVICES | Facility: HOSPITAL | Age: 54
LOS: 1 days | End: 2024-05-23

## 2024-05-23 ENCOUNTER — EMERGENCY (EMERGENCY)
Facility: HOSPITAL | Age: 54
LOS: 1 days | Discharge: HOME | End: 2024-05-23
Attending: EMERGENCY MEDICINE | Admitting: EMERGENCY MEDICINE
Payer: COMMERCIAL

## 2024-05-23 ENCOUNTER — APPOINTMENT (OUTPATIENT)
Dept: GASTROENTEROLOGY | Facility: CLINIC | Age: 54
End: 2024-05-23
Payer: COMMERCIAL

## 2024-05-23 VITALS
HEART RATE: 70 BPM | SYSTOLIC BLOOD PRESSURE: 208 MMHG | DIASTOLIC BLOOD PRESSURE: 126 MMHG | RESPIRATION RATE: 17 BRPM | TEMPERATURE: 98 F | OXYGEN SATURATION: 100 %

## 2024-05-23 VITALS
SYSTOLIC BLOOD PRESSURE: 229 MMHG | HEART RATE: 83 BPM | WEIGHT: 207 LBS | OXYGEN SATURATION: 100 % | DIASTOLIC BLOOD PRESSURE: 138 MMHG | HEIGHT: 66 IN | BODY MASS INDEX: 33.27 KG/M2

## 2024-05-23 VITALS — DIASTOLIC BLOOD PRESSURE: 94 MMHG | HEART RATE: 71 BPM | SYSTOLIC BLOOD PRESSURE: 159 MMHG

## 2024-05-23 DIAGNOSIS — I10 ESSENTIAL (PRIMARY) HYPERTENSION: ICD-10-CM

## 2024-05-23 DIAGNOSIS — Z12.11 ENCOUNTER FOR SCREENING FOR MALIGNANT NEOPLASM OF COLON: ICD-10-CM

## 2024-05-23 LAB
ALBUMIN SERPL ELPH-MCNC: 4.4 G/DL — SIGNIFICANT CHANGE UP (ref 3.3–5)
ALP SERPL-CCNC: 98 U/L — SIGNIFICANT CHANGE UP (ref 40–120)
ALT FLD-CCNC: 15 U/L — SIGNIFICANT CHANGE UP (ref 4–41)
ANION GAP SERPL CALC-SCNC: 12 MMOL/L — SIGNIFICANT CHANGE UP (ref 7–14)
ANISOCYTOSIS BLD QL: SLIGHT — SIGNIFICANT CHANGE UP
AST SERPL-CCNC: 27 U/L — SIGNIFICANT CHANGE UP (ref 4–40)
BASOPHILS # BLD AUTO: 0.09 K/UL — SIGNIFICANT CHANGE UP (ref 0–0.2)
BASOPHILS NFR BLD AUTO: 1.8 % — SIGNIFICANT CHANGE UP (ref 0–2)
BILIRUB SERPL-MCNC: 0.5 MG/DL — SIGNIFICANT CHANGE UP (ref 0.2–1.2)
BUN SERPL-MCNC: 9 MG/DL — SIGNIFICANT CHANGE UP (ref 7–23)
CALCIUM SERPL-MCNC: 9.7 MG/DL — SIGNIFICANT CHANGE UP (ref 8.4–10.5)
CHLORIDE SERPL-SCNC: 100 MMOL/L — SIGNIFICANT CHANGE UP (ref 98–107)
CO2 SERPL-SCNC: 25 MMOL/L — SIGNIFICANT CHANGE UP (ref 22–31)
CREAT SERPL-MCNC: 1.05 MG/DL — SIGNIFICANT CHANGE UP (ref 0.5–1.3)
EGFR: 84 ML/MIN/1.73M2 — SIGNIFICANT CHANGE UP
EOSINOPHIL # BLD AUTO: 0.04 K/UL — SIGNIFICANT CHANGE UP (ref 0–0.5)
EOSINOPHIL NFR BLD AUTO: 0.9 % — SIGNIFICANT CHANGE UP (ref 0–6)
GIANT PLATELETS BLD QL SMEAR: PRESENT — SIGNIFICANT CHANGE UP
GLUCOSE SERPL-MCNC: 86 MG/DL — SIGNIFICANT CHANGE UP (ref 70–99)
HCT VFR BLD CALC: 46.4 % — SIGNIFICANT CHANGE UP (ref 39–50)
HGB BLD-MCNC: 14.7 G/DL — SIGNIFICANT CHANGE UP (ref 13–17)
IANC: 2.99 K/UL — SIGNIFICANT CHANGE UP (ref 1.8–7.4)
LYMPHOCYTES # BLD AUTO: 1.42 K/UL — SIGNIFICANT CHANGE UP (ref 1–3.3)
LYMPHOCYTES # BLD AUTO: 28.9 % — SIGNIFICANT CHANGE UP (ref 13–44)
MANUAL SMEAR VERIFICATION: SIGNIFICANT CHANGE UP
MCHC RBC-ENTMCNC: 21.8 PG — LOW (ref 27–34)
MCHC RBC-ENTMCNC: 31.7 GM/DL — LOW (ref 32–36)
MCV RBC AUTO: 68.7 FL — LOW (ref 80–100)
MICROCYTES BLD QL: SLIGHT — SIGNIFICANT CHANGE UP
MONOCYTES # BLD AUTO: 0.56 K/UL — SIGNIFICANT CHANGE UP (ref 0–0.9)
MONOCYTES NFR BLD AUTO: 11.4 % — SIGNIFICANT CHANGE UP (ref 2–14)
NEUTROPHILS # BLD AUTO: 2.77 K/UL — SIGNIFICANT CHANGE UP (ref 1.8–7.4)
NEUTROPHILS NFR BLD AUTO: 56.1 % — SIGNIFICANT CHANGE UP (ref 43–77)
PLAT MORPH BLD: NORMAL — SIGNIFICANT CHANGE UP
PLATELET # BLD AUTO: 182 K/UL — SIGNIFICANT CHANGE UP (ref 150–400)
PLATELET COUNT - ESTIMATE: NORMAL — SIGNIFICANT CHANGE UP
POLYCHROMASIA BLD QL SMEAR: SLIGHT — SIGNIFICANT CHANGE UP
POTASSIUM SERPL-MCNC: 3.5 MMOL/L — SIGNIFICANT CHANGE UP (ref 3.5–5.3)
POTASSIUM SERPL-SCNC: 3.5 MMOL/L — SIGNIFICANT CHANGE UP (ref 3.5–5.3)
PROT SERPL-MCNC: 8.1 G/DL — SIGNIFICANT CHANGE UP (ref 6–8.3)
RBC # BLD: 6.75 M/UL — HIGH (ref 4.2–5.8)
RBC # FLD: 17.2 % — HIGH (ref 10.3–14.5)
RBC BLD AUTO: ABNORMAL
SODIUM SERPL-SCNC: 137 MMOL/L — SIGNIFICANT CHANGE UP (ref 135–145)
VARIANT LYMPHS # BLD: 0.9 % — SIGNIFICANT CHANGE UP (ref 0–6)
WBC # BLD: 4.93 K/UL — SIGNIFICANT CHANGE UP (ref 3.8–10.5)
WBC # FLD AUTO: 4.93 K/UL — SIGNIFICANT CHANGE UP (ref 3.8–10.5)

## 2024-05-23 PROCEDURE — 93010 ELECTROCARDIOGRAM REPORT: CPT

## 2024-05-23 PROCEDURE — 99291 CRITICAL CARE FIRST HOUR: CPT

## 2024-05-23 PROCEDURE — 99203 OFFICE O/P NEW LOW 30 MIN: CPT | Mod: GC

## 2024-05-23 RX ORDER — LOSARTAN POTASSIUM 100 MG/1
1 TABLET, FILM COATED ORAL
Qty: 30 | Refills: 0
Start: 2024-05-23 | End: 2024-06-21

## 2024-05-23 RX ORDER — LOSARTAN POTASSIUM 100 MG/1
100 TABLET, FILM COATED ORAL DAILY
Refills: 0 | Status: DISCONTINUED | OUTPATIENT
Start: 2024-05-23 | End: 2024-05-26

## 2024-05-23 RX ORDER — AMLODIPINE BESYLATE 2.5 MG/1
1 TABLET ORAL
Qty: 30 | Refills: 0
Start: 2024-05-23 | End: 2024-06-21

## 2024-05-23 RX ORDER — LABETALOL HCL 100 MG
5 TABLET ORAL ONCE
Refills: 0 | Status: COMPLETED | OUTPATIENT
Start: 2024-05-23 | End: 2024-05-23

## 2024-05-23 RX ORDER — HYDRALAZINE HCL 50 MG
25 TABLET ORAL ONCE
Refills: 0 | Status: COMPLETED | OUTPATIENT
Start: 2024-05-23 | End: 2024-05-23

## 2024-05-23 RX ORDER — AMLODIPINE BESYLATE 2.5 MG/1
10 TABLET ORAL ONCE
Refills: 0 | Status: COMPLETED | OUTPATIENT
Start: 2024-05-23 | End: 2024-05-23

## 2024-05-23 RX ORDER — CHLORTHALIDONE 50 MG
0.5 TABLET ORAL
Qty: 15 | Refills: 0
Start: 2024-05-23 | End: 2024-06-21

## 2024-05-23 RX ORDER — AMLODIPINE BESYLATE 2.5 MG/1
5 TABLET ORAL ONCE
Refills: 0 | Status: DISCONTINUED | OUTPATIENT
Start: 2024-05-23 | End: 2024-05-23

## 2024-05-23 RX ADMIN — AMLODIPINE BESYLATE 10 MILLIGRAM(S): 2.5 TABLET ORAL at 11:49

## 2024-05-23 RX ADMIN — LOSARTAN POTASSIUM 100 MILLIGRAM(S): 100 TABLET, FILM COATED ORAL at 11:58

## 2024-05-23 RX ADMIN — Medication 25 MILLIGRAM(S): at 13:11

## 2024-05-23 RX ADMIN — Medication 5 MILLIGRAM(S): at 13:11

## 2024-05-23 RX ADMIN — Medication 5 MILLIGRAM(S): at 12:03

## 2024-05-23 NOTE — ED ADULT TRIAGE NOTE - CHIEF COMPLAINT QUOTE
Pt was sent from gastroenterologist for hypertension. Pt denies headache, dizziness, blurry vision. No slurred speech or facial droop noted. Pt states that he has been waiting for blood pressure medications and they have not been sent. Hx of HTN

## 2024-05-23 NOTE — ED ADULT NURSE REASSESSMENT NOTE - NS ED NURSE REASSESS COMMENT FT1
Pt continues to deny chest pain, sob, headache, blurry vision, dizziness. BP improving however remains elevated, will make resident aware. will continue to monitor. nsr on CM.

## 2024-05-23 NOTE — ED PROVIDER NOTE - PROGRESS NOTE DETAILS
Robb Andrea MD (PGY3): BP improved, diastolic below 100. Will send rx to pharmacy. Will discharge w/ PMD f/u.

## 2024-05-23 NOTE — ED PROVIDER NOTE - PHYSICAL EXAMINATION
GENERAL: well appearing in no acute distress, non-toxic appearing  CARDIAC: regular rate and rhythm, normal S1S2, no appreciable murmurs, 2+ pulses in UE/LE b/l  PULM: normal breath sounds, clear to ascultation bilaterally, no rales, rhonchi, wheezing  GI: abdomen nondistended, soft, nontender, no guarding, rebound tenderness  : no CVA tenderness b/l, no suprapubic tenderness  NEURO: no focal motor or sensory deficits, , normal speech,   MSK: no peripheral edema, no calf tenderness b/l

## 2024-05-23 NOTE — ED PROVIDER NOTE - PATIENT PORTAL LINK FT
You can access the FollowMyHealth Patient Portal offered by [facility] by registering at the following website: [email or web address]. By joining [facility]’s FollowMyHealth portal, you will also be able to view your health information using other applications (apps) compatible with our system.

## 2024-05-23 NOTE — ED ADULT NURSE NOTE - OBJECTIVE STATEMENT
Pt received to rm 22 presents with elevated BP, reports he has been without his BP meds for about 2 weeks. a&ox4, ambulatory at baseline, skin intact, respirations even and unlabored, abd soft and nondistended nontender to palpation. NSR on CM, 20g Iv placed in left arm labs drawn and sent, denies headache, dizziness, chest pain, sob, abd pain, blurry vision or any other symptoms. will medicate as per ordered and continue to monitor.

## 2024-05-23 NOTE — ED PROVIDER NOTE - OBJECTIVE STATEMENT
53y HTN, HLD, CVA, latent TB on rifampin presents to ed for hypertension. has been following with GI for abdominal pain and was found to be hypertensive in office so sent to ED. was supposed to be taking Chlorthalidone 12.5, losartan, and amlodipine, and has not been taking them. denies any headache, dizziness, change in vision, chest pain, sob, nausea, vomiting, abdominal pain, lower extremity swelling. on arrival /126 other vitals wnl. patient very well appearing in NAD

## 2024-05-23 NOTE — ED ADULT TRIAGE NOTE - NS ED NURSE BANDS TYPE
Detail Level: Detailed
Name band;
Quality 130: Documentation Of Current Medications In The Medical Record: Current Medications with Name, Dosage, Frequency, or Route not Documented, Reason not Given

## 2024-05-23 NOTE — ED PROVIDER NOTE - CLINICAL SUMMARY MEDICAL DECISION MAKING FREE TEXT BOX
53y HTN, HLD, CVA, latent TB on rifampin presents to ed for asymptomatic hypertension. patient on three antihypertensives, no headache dizziness vision change chest pain sob abdominal pain n/v leg swelling. /126 will give home meds get basic labs check renla function and reassess /repeat vitals post BP meds. no signs of end organ damage, just medication non compliance. will make sure patient gets BP meds sent to pharmacy.

## 2024-05-23 NOTE — ED PROVIDER NOTE - ATTENDING CONTRIBUTION TO CARE
Goldy Lisa  ID: [id number]  Pt was seen and evaluated by me. Pt is a 54 y/o male with PMHx of HTN, HLD, CVA, and latent TB on rifampin who presented to the ED for elevated BP today. Pt states he was at the GI doctor today and was found to have elevated BP. Pt denies any headache, vision changes, fever, chills, nausea, vomiting, SOB, chest pain, or abd pain. Pt notes he was supposed to be taking Chlorthalidone 12.5, losartan, and amlodipine, and has not been taking for 3 wks as his prescriptions were not sent to the pharmacy. BP here noted to be 208/126.  VITALS: Vitals have been reviewed.  GEN APPEARANCE: Alert and cooperative, non-toxic appearing and in NAD  HEAD: Atraumatic, normocephalic.   EYES: PERRL, EOMI.   EARS: Gross hearing intact.   NOSE: No nasal discharge.   THROAT: MMM. Oral cavity and pharynx normal. Uvula midline. No swelling. No exudate.    NECK: Supple, no lymphadenopathy  CV: RRR, S1S2, no c/r/m/g. No cyanosis or pallor.   LUNGS: CTAB. No wheezing. No rales. No rhonchi. No diminished breath sounds.   ABDOMEN: Soft, NTND. No guarding or rebound.   MSK/EXT: Spine appears normal, no spine point tenderness. No calf tenderness or swelling.   NEURO: Alert, follows commands. Speech normal. Sensation and motor normal x4 extremities.   SKIN: Normal color for race, warm, dry and intact. No evidence of rash.  54 y/o male with PMHx of HTN, HLD, CVA, and latent TB on rifampin who presented to the ED for elevated BP today.   Concern for HTN Urgency, Asymptomatic HTN, Medication non-compliance  Will obtain labs and given antihypertensives

## 2024-05-23 NOTE — ED ADULT NURSE NOTE - BEFAST FACE
Alonzo Peterson   MRN: [id number]    Department:  [facility] Emergency Department   Date of Visit:  5/27/2019           Disclosure     Insurance plans vary and the physician(s) referred by the ER may not be covered by your plan.  Please contac CARE PHYSICIAN AT ONCE OR RETURN IMMEDIATELY TO THE EMERGENCY DEPARTMENT. If you have been prescribed any medication(s), please fill your prescription right away and begin taking the medication(s) as directed.   If you believe that any of the medications No

## 2024-05-23 NOTE — ED ADULT NURSE NOTE - NSFALLUNIVINTERV_ED_ALL_ED
Bed/Stretcher in lowest position, wheels locked, appropriate side rails in place/Call bell, personal items and telephone in reach/Instruct patient to call for assistance before getting out of bed/chair/stretcher/Non-slip footwear applied when patient is off stretcher/[place] to call system/Physically safe environment - no spills, clutter or unnecessary equipment/Purposeful proactive rounding/Room/bathroom lighting operational, light cord in reach

## 2024-05-23 NOTE — ED PROVIDER NOTE - NSFOLLOWUPINSTRUCTIONS_ED_ALL_ED_FT
You were seen in the Emergency Department for hypertension. Lab and imaging results, if performed, were discussed with you along with your discharge diagnosis.    Follow up with your doctor in 1 week - bring copies of your results if you were given. If you do not have a primary doctor, please call [phone] to find one convenient for you.    Continue all prescribed medications. Prescriptions for Amlodipine, Chlorthalidone, and Losartan was sent to your pharmacy.     Return to ED for any new or worsening symptoms including but not limited to: development of chest pain, shortness of breath, fever, vomiting, focal numbness, weakness or tingling, any severe CP, headache, abdominal pain, back pain.      Rest and keep yourself hydrated with fluids

## 2024-05-23 NOTE — ED ADULT NURSE REASSESSMENT NOTE - NS ED NURSE REASSESS COMMENT FT1
Break coverage RN: Received patient in 22A. Patient resting in stretcher, no distress noted. Patient denies any pain/discomfort at this time. Patient is A&OX4, airway patent, breathing unlabored and even, radial pulses palpable. Patient is on cardiac monitor sinus rhythm w/O2 sat 99% on a room air. Side rails up and safety maintained.

## 2024-05-24 RX ORDER — LOSARTAN POTASSIUM 100 MG/1
100 TABLET, FILM COATED ORAL DAILY
Qty: 3 | Refills: 0 | Status: ACTIVE | COMMUNITY
Start: 2024-03-06 | End: 1900-01-01

## 2024-05-28 ENCOUNTER — RX RENEWAL (OUTPATIENT)
Age: 54
End: 2024-05-28

## 2024-05-28 RX ORDER — LOSARTAN POTASSIUM 50 MG/1
50 TABLET, FILM COATED ORAL
Qty: 90 | Refills: 0 | Status: ACTIVE | COMMUNITY
Start: 2024-01-12 | End: 1900-01-01

## 2024-05-29 DIAGNOSIS — Z12.11 ENCOUNTER FOR SCREENING FOR MALIGNANT NEOPLASM OF COLON: ICD-10-CM

## 2024-05-29 DIAGNOSIS — I10 ESSENTIAL (PRIMARY) HYPERTENSION: ICD-10-CM

## 2024-05-29 NOTE — ASSESSMENT
[FreeTextEntry1] : 52yo M w/ PMHx HTN, HLD, CVA (2023), latent TB (on rifampin) who presents for colon cancer screening. Patient found with + on arrival to clinic today. Repeat BP reading during assessment 195/133.   #Hypertensive Urgency - off full BP regimen x2 weeks iso inability to obtain medications #Colon Cancer Screening  Recommendations: -ER transfer today for BP management -will need to reschedule colon cancer screening visit   Logan Henson MD PGY-5 GI/Hepatology Fellow  Discussed with Dr. Tidwell

## 2024-05-29 NOTE — END OF VISIT
[] : Fellow [FreeTextEntry3] : As modified and discussed with patient MD AUDREY Gregory [facility] Associate Professor of Medicine [facility]

## 2024-05-29 NOTE — PHYSICAL EXAM
Pulmonology [Alert] : alert [Healthy Appearing] : healthy appearing [No Acute Distress] : no acute distress [No Respiratory Distress] : no respiratory distress [Heart Rate And Rhythm] : heart rate was normal and rhythm regular [Murmurs] : no murmurs [Bowel Sounds] : normal bowel sounds [Abdomen Tenderness] : non-tender [Cranial Nerves Intact] : cranial nerves 2-12 were intact [No Focal Deficits] : no focal deficits [Motor Exam] : the motor exam was normal [Oriented To Time, Place, And Person] : oriented to person, place, and time [Normal Affect] : the affect was normal [de-identified] : 5/5 strength UE and LE b/l

## 2024-05-29 NOTE — HISTORY OF PRESENT ILLNESS
[FreeTextEntry1] : Maria L Intepreter ID: [id number]  54yo M w/ PMHx HTN, HLD, CVA (2023), latent TB (on rifampin) who presents for colon cancer screening. Patient found with + on arrival to clinic today. Repeat BP reading during assessment 195/133. Patient reports that he is supposed to be taking 3 anti-hypertensive medications, however he reports that for the past 15 days he has only been taking his Losartan 100mg qD during this time. Reports was not able to obtain medications. Today on exam patient AOx3, ambulatory, 5/5 strength UE/LE b/l. Patient denies n/v/f/c/ap/cp/HA/vision changes. Patient reports he got a ride to ER today, but is agreeable to ambulance transfer to ER for further management.  Colon Cancer screening visit deferred for now, will need appointment made again for colonoscopy scheduling.

## 2024-08-06 ENCOUNTER — APPOINTMENT (OUTPATIENT)
Dept: INTERNAL MEDICINE | Facility: CLINIC | Age: 54
End: 2024-08-06

## 2024-08-06 ENCOUNTER — OUTPATIENT (OUTPATIENT)
Dept: OUTPATIENT SERVICES | Facility: HOSPITAL | Age: 54
LOS: 1 days | End: 2024-08-06

## 2024-08-06 PROCEDURE — 99213 OFFICE O/P EST LOW 20 MIN: CPT | Mod: 25

## 2024-08-06 NOTE — PHYSICAL EXAM
[No Acute Distress] : no acute distress [No JVD] : no jugular venous distention [Supple] : supple [Normal] : normal rate, regular rhythm, normal S1 and S2 and no murmur heard [Pedal Pulses Present] : the pedal pulses are present [No Edema] : there was no peripheral edema [Soft] : abdomen soft [Non Tender] : non-tender [Non-distended] : non-distended [Grossly Normal Strength/Tone] : grossly normal strength/tone [No Rash] : no rash [Coordination Grossly Intact] : coordination grossly intact [No Focal Deficits] : no focal deficits [Normal Gait] : normal gait [Alert and Oriented x3] : oriented to person, place, and time

## 2024-08-11 NOTE — HISTORY OF PRESENT ILLNESS
[FreeTextEntry1] : f/u [de-identified] : 52 y/o Creole-speaking male w/ PMHx of HTN, HLD, CVA, latent TB on rifampin who presented for BP management.  Patient last seen in clinic 4/2024. Patient was on losartan 100mg qd, amlodipine 10mg and chlorthalidone 12.5mg qd. However, patient presented to GI visit on 5/2024 and found to have BP reading of 195/133. He was sent to the ED for further evaluation. In the ED, patient was asymptomatic w/ 208/12. Home meds were given and subsequently discharged.  Today, patient reported BPs have been around 130/80s. He ran out of amlodipine and chlorthalidone the past week so losartan 100mg qd.  Denied any symptoms such as dizziness, lightheadedness, syncopal episodes, chest pain, sob, blurry vision, headaches, n/v, lower extremity swelling. Patient also noted taking aspirin 81mg, atorvastatin 40mg and rifampin 300mg qd.

## 2024-08-11 NOTE — ASSESSMENT
[FreeTextEntry1] : This is 54 y/o M w/ PMHx of HTN, HLD, CVA, latent TB on rifampin who presented for BP management.  HCM: flu: UTD Tdap: UTD Shingles: defer colon ca: will need to reschedule GI visit   RTC in 5 weeks for hypertension.  At next visit, will send hemoglobin electrophoresis (normal hgb but low MCV and elevated RDW).  Discussed w/ Dr. Lang.   Nita Bentley PGY2 [employer].

## 2024-08-11 NOTE — INTERPRETER SERVICES
[[employer] ] : provided by [employer]   [Time Spent: ____ minutes] : Total time spent using  services: [unfilled] minutes. The patient's primary language is not English thus required  services. [Interpreters_IDNumber] : [id number] [Interpreters_FullName] : Elizabeth

## 2024-08-11 NOTE — REVIEW OF SYSTEMS
[Fever] : no fever [Chills] : no chills [Vision Problems] : no vision problems [Sore Throat] : no sore throat [Chest Pain] : no chest pain [Palpitations] : no palpitations [Shortness Of Breath] : no shortness of breath [Cough] : no cough [Abdominal Pain] : no abdominal pain [Nausea] : no nausea [Vomiting] : no vomiting [Dysuria] : no dysuria [Joint Pain] : no joint pain [Back Pain] : no back pain [Skin Rash] : no skin rash [Headache] : no headache

## 2024-08-11 NOTE — END OF VISIT
[] : Resident [FreeTextEntry3] : 52 y/o Creole-speaking male w/ PMHx of HTN, HLD, CVA, latent TB on rifampin who presented for BP management. Had not been taking amlodipine 5mg and chlorthalidone 12.5mg for 1 week prior to visit. States home readings are 120s-130s systolic. Will restart meds and bring back for repeat in 5 weeks. Rest as above.

## 2024-08-11 NOTE — INTERPRETER SERVICES
[[employer] ] : provided by [employer]   [Time Spent: ____ minutes] : Total time spent using  services: [unfilled] minutes. The patient's primary language is not English thus required  services. [Interpreters_IDNumber] : [id number] [Interpreters_FullName] : Elizabeth

## 2024-08-11 NOTE — HISTORY OF PRESENT ILLNESS
[FreeTextEntry1] : f/u [de-identified] : 52 y/o Creole-speaking male w/ PMHx of HTN, HLD, CVA, latent TB on rifampin who presented for BP management.  Patient last seen in clinic 4/2024. Patient was on losartan 100mg qd, amlodipine 10mg and chlorthalidone 12.5mg qd. However, patient presented to GI visit on 5/2024 and found to have BP reading of 195/133. He was sent to the ED for further evaluation. In the ED, patient was asymptomatic w/ 208/12. Home meds were given and subsequently discharged.  Today, patient reported BPs have been around 130/80s. He ran out of amlodipine and chlorthalidone the past week so losartan 100mg qd.  Denied any symptoms such as dizziness, lightheadedness, syncopal episodes, chest pain, sob, blurry vision, headaches, n/v, lower extremity swelling. Patient also noted taking aspirin 81mg, atorvastatin 40mg and rifampin 300mg qd.

## 2024-08-11 NOTE — ASSESSMENT
[FreeTextEntry1] : This is 52 y/o M w/ PMHx of HTN, HLD, CVA, latent TB on rifampin who presented for BP management.  HCM: flu: UTD Tdap: UTD Shingles: defer colon ca: will need to reschedule GI visit   RTC in 5 weeks for hypertension.  At next visit, will send hemoglobin electrophoresis (normal hgb but low MCV and elevated RDW).  Discussed w/ Dr. Lang.   Nita Bentley PGY2 [employer].

## 2024-08-11 NOTE — INTERPRETER SERVICES
[[employer] ] : provided by [employer]   [Time Spent: ____ minutes] : Total time spent using  services: [unfilled] minutes. The patient's primary language is not English thus required  services. [Interpreters_IDNumber] : [id number] [Interpreters_FullName] : Elizabeth

## 2024-08-11 NOTE — HISTORY OF PRESENT ILLNESS
[FreeTextEntry1] : f/u [de-identified] : 54 y/o Creole-speaking male w/ PMHx of HTN, HLD, CVA, latent TB on rifampin who presented for BP management.  Patient last seen in clinic 4/2024. Patient was on losartan 100mg qd, amlodipine 10mg and chlorthalidone 12.5mg qd. However, patient presented to GI visit on 5/2024 and found to have BP reading of 195/133. He was sent to the ED for further evaluation. In the ED, patient was asymptomatic w/ 208/12. Home meds were given and subsequently discharged.  Today, patient reported BPs have been around 130/80s. He ran out of amlodipine and chlorthalidone the past week so losartan 100mg qd.  Denied any symptoms such as dizziness, lightheadedness, syncopal episodes, chest pain, sob, blurry vision, headaches, n/v, lower extremity swelling. Patient also noted taking aspirin 81mg, atorvastatin 40mg and rifampin 300mg qd.

## 2024-08-14 DIAGNOSIS — Z22.7 LATENT TUBERCULOSIS: ICD-10-CM

## 2024-08-14 DIAGNOSIS — I63.9 CEREBRAL INFARCTION, UNSPECIFIED: ICD-10-CM

## 2024-08-14 DIAGNOSIS — I10 ESSENTIAL (PRIMARY) HYPERTENSION: ICD-10-CM

## 2024-08-14 DIAGNOSIS — E78.5 HYPERLIPIDEMIA, UNSPECIFIED: ICD-10-CM

## 2024-09-10 ENCOUNTER — OUTPATIENT (OUTPATIENT)
Dept: OUTPATIENT SERVICES | Facility: HOSPITAL | Age: 54
LOS: 1 days | End: 2024-09-10

## 2024-09-10 ENCOUNTER — APPOINTMENT (OUTPATIENT)
Dept: INTERNAL MEDICINE | Facility: CLINIC | Age: 54
End: 2024-09-10

## 2024-09-10 VITALS
HEIGHT: 66 IN | DIASTOLIC BLOOD PRESSURE: 97 MMHG | WEIGHT: 210 LBS | SYSTOLIC BLOOD PRESSURE: 148 MMHG | OXYGEN SATURATION: 98 % | HEART RATE: 71 BPM | BODY MASS INDEX: 33.75 KG/M2

## 2024-09-10 VITALS — DIASTOLIC BLOOD PRESSURE: 90 MMHG | SYSTOLIC BLOOD PRESSURE: 140 MMHG

## 2024-09-10 DIAGNOSIS — I10 ESSENTIAL (PRIMARY) HYPERTENSION: ICD-10-CM

## 2024-09-10 DIAGNOSIS — Z12.11 ENCOUNTER FOR SCREENING FOR MALIGNANT NEOPLASM OF COLON: ICD-10-CM

## 2024-09-10 PROCEDURE — 99213 OFFICE O/P EST LOW 20 MIN: CPT | Mod: GC

## 2024-09-10 NOTE — PHYSICAL EXAM
[No Acute Distress] : no acute distress [Well-Appearing] : well-appearing [No JVD] : no jugular venous distention [Supple] : supple [Normal] : normal rate, regular rhythm, normal S1 and S2 and no murmur heard [Pedal Pulses Present] : the pedal pulses are present [No Edema] : there was no peripheral edema [Soft] : abdomen soft [Non Tender] : non-tender [Non-distended] : non-distended [Grossly Normal Strength/Tone] : grossly normal strength/tone [Coordination Grossly Intact] : coordination grossly intact [No Focal Deficits] : no focal deficits [Normal Gait] : normal gait [Alert and Oriented x3] : oriented to person, place, and time

## 2024-09-11 NOTE — REVIEW OF SYSTEMS
[Fever] : no fever [Chills] : no chills [Recent Change In Weight] : ~T no recent weight change [Vision Problems] : no vision problems [Sore Throat] : no sore throat [Chest Pain] : no chest pain [Palpitations] : no palpitations [Lower Ext Edema] : no lower extremity edema [Shortness Of Breath] : no shortness of breath [Wheezing] : no wheezing [Cough] : no cough [Dyspnea on Exertion] : not dyspnea on exertion [Abdominal Pain] : no abdominal pain [Nausea] : no nausea [Vomiting] : no vomiting [Dysuria] : no dysuria [Joint Pain] : no joint pain [Back Pain] : no back pain [Skin Rash] : no skin rash [Headache] : no headache [Dizziness] : no dizziness

## 2024-09-11 NOTE — END OF VISIT
[] : Resident [FreeTextEntry3] : 54 y/o Creole-speaking male w/ PMHx of HTN, HLD, CVA, latent TB s/p rifampin who presented for BP management. Patient with reported blood pressures in 110s-120s at home w/o symptomatic hypotension. In office, BP above goal. Will up-titrate chlorthalidone and bring back in 10 weeks for BP check. Educated on warning signs for hypotension.

## 2024-09-11 NOTE — INTERPRETER SERVICES
[[employer] ] : provided by [employer]   [Time Spent: ____ minutes] : Total time spent using  services: [unfilled] minutes. The patient's primary language is not English thus required  services. [Interpreters_IDNumber] : [id number] [Interpreters_FullName] : Chema

## 2024-09-11 NOTE — ASSESSMENT
[FreeTextEntry1] : This is 54 y/o M w/ PMHx of HTN, HLD, CVA, latent TB s/p rifampin who presented for BP management.  HCM: flu: UTD Tdap: UTD Shingles: defer colon ca: reschedule GI visit  RTC in 10 weeks for hypertension. At next visit, will send hemoglobin electrophoresis (normal hgb but low MCV and elevated RDW).  Discussed w/ Dr. Lang.  Nita Bentley PGY2 [employer].

## 2024-09-11 NOTE — HISTORY OF PRESENT ILLNESS
[Spouse] : spouse [FreeTextEntry1] : BP f/u [de-identified] : 54 y/o Creole-speaking male w/ PMHx of HTN, HLD, CVA, latent TB s/p rifampin who presented for BP management.  Patient last seen in clinic 8/2024. Patient reported BPs at home to range from the 115-120s/80-90s. He noted compliance w/ losartan 100mg qd and amlodipine 5mg qd. Patient noted intermittently taking chlorthalidone 12.5mg as he thought it was a weight loss medication. Discussed w/ patient that chlorthalidone is a medication for his HTN, not weight loss. Patient voiced understanding.  Denied any symptoms such as dizziness, lightheadedness, syncopal episodes, chest pain, sob, blurry vision, headaches, n/v, lower extremity swelling. Patient also noted taking aspirin 81mg and atorvastatin 40mg.

## 2024-10-10 ENCOUNTER — APPOINTMENT (OUTPATIENT)
Dept: GASTROENTEROLOGY | Facility: CLINIC | Age: 54
End: 2024-10-10
Payer: COMMERCIAL

## 2024-10-10 ENCOUNTER — OUTPATIENT (OUTPATIENT)
Dept: OUTPATIENT SERVICES | Facility: HOSPITAL | Age: 54
LOS: 1 days | End: 2024-10-10

## 2024-10-10 VITALS
WEIGHT: 209 LBS | OXYGEN SATURATION: 98 % | BODY MASS INDEX: 33.59 KG/M2 | SYSTOLIC BLOOD PRESSURE: 140 MMHG | HEART RATE: 63 BPM | HEIGHT: 66 IN | RESPIRATION RATE: 16 BRPM | DIASTOLIC BLOOD PRESSURE: 106 MMHG

## 2024-10-10 DIAGNOSIS — Z12.11 ENCOUNTER FOR SCREENING FOR MALIGNANT NEOPLASM OF COLON: ICD-10-CM

## 2024-10-10 DIAGNOSIS — R12 HEARTBURN: ICD-10-CM

## 2024-10-10 DIAGNOSIS — K59.00 CONSTIPATION, UNSPECIFIED: ICD-10-CM

## 2024-10-10 PROCEDURE — 99214 OFFICE O/P EST MOD 30 MIN: CPT

## 2024-10-10 RX ORDER — POLYETHYLENE GLYCOL 3350 17 G/17G
17 POWDER, FOR SOLUTION ORAL DAILY
Qty: 1 | Refills: 5 | Status: ACTIVE | COMMUNITY
Start: 2024-10-10 | End: 1900-01-01

## 2024-10-10 RX ORDER — POLYETHYLENE GLYCOL 3350 AND ELECTROLYTES WITH LEMON FLAVOR 236; 22.74; 6.74; 5.86; 2.97 G/4L; G/4L; G/4L; G/4L; G/4L
236 POWDER, FOR SOLUTION ORAL
Qty: 1 | Refills: 0 | Status: ACTIVE | COMMUNITY
Start: 2024-10-10 | End: 1900-01-01

## 2024-10-10 RX ORDER — BISACODYL 5 MG
5 TABLET, DELAYED RELEASE (ENTERIC COATED) ORAL
Qty: 4 | Refills: 0 | Status: ACTIVE | COMMUNITY
Start: 2024-10-10 | End: 1900-01-01

## 2024-10-14 DIAGNOSIS — R12 HEARTBURN: ICD-10-CM

## 2024-10-14 DIAGNOSIS — Z12.11 ENCOUNTER FOR SCREENING FOR MALIGNANT NEOPLASM OF COLON: ICD-10-CM

## 2024-10-14 DIAGNOSIS — K59.00 CONSTIPATION, UNSPECIFIED: ICD-10-CM

## 2024-10-23 ENCOUNTER — NON-APPOINTMENT (OUTPATIENT)
Age: 54
End: 2024-10-23

## 2024-10-31 ENCOUNTER — INPATIENT (INPATIENT)
Facility: HOSPITAL | Age: 54
LOS: 3 days | Discharge: HOME | End: 2024-11-04
Attending: INTERNAL MEDICINE | Admitting: INTERNAL MEDICINE
Payer: COMMERCIAL

## 2024-10-31 VITALS
RESPIRATION RATE: 15 BRPM | OXYGEN SATURATION: 96 % | DIASTOLIC BLOOD PRESSURE: 89 MMHG | SYSTOLIC BLOOD PRESSURE: 141 MMHG | HEIGHT: 64.96 IN | TEMPERATURE: 99 F | HEART RATE: 65 BPM

## 2024-10-31 DIAGNOSIS — R94.31 ABNORMAL ELECTROCARDIOGRAM [ECG] [EKG]: ICD-10-CM

## 2024-10-31 DIAGNOSIS — H53.461 HOMONYMOUS BILATERAL FIELD DEFECTS, RIGHT SIDE: ICD-10-CM

## 2024-10-31 DIAGNOSIS — Z87.898 PERSONAL HISTORY OF OTHER SPECIFIED CONDITIONS: ICD-10-CM

## 2024-10-31 DIAGNOSIS — Z29.9 ENCOUNTER FOR PROPHYLACTIC MEASURES, UNSPECIFIED: ICD-10-CM

## 2024-10-31 DIAGNOSIS — I10 ESSENTIAL (PRIMARY) HYPERTENSION: ICD-10-CM

## 2024-10-31 DIAGNOSIS — E87.6 HYPOKALEMIA: ICD-10-CM

## 2024-10-31 DIAGNOSIS — I63.531 CEREBRAL INFARCTION DUE TO UNSPECIFIED OCCLUSION OR STENOSIS OF RIGHT POSTERIOR CEREBRAL ARTERY: ICD-10-CM

## 2024-10-31 LAB
ADD ON TEST-SPECIMEN IN LAB: SIGNIFICANT CHANGE UP
ALBUMIN SERPL ELPH-MCNC: 4.5 G/DL — SIGNIFICANT CHANGE UP (ref 3.3–5)
ALP SERPL-CCNC: 91 U/L — SIGNIFICANT CHANGE UP (ref 40–120)
ALT FLD-CCNC: 18 U/L — SIGNIFICANT CHANGE UP (ref 4–41)
AMPHET UR-MCNC: NEGATIVE — SIGNIFICANT CHANGE UP
ANION GAP SERPL CALC-SCNC: 13 MMOL/L — SIGNIFICANT CHANGE UP (ref 7–14)
APTT BLD: 27.1 SEC — SIGNIFICANT CHANGE UP (ref 24.5–35.6)
AST SERPL-CCNC: 23 U/L — SIGNIFICANT CHANGE UP (ref 4–40)
BARBITURATES UR SCN-MCNC: NEGATIVE — SIGNIFICANT CHANGE UP
BASOPHILS # BLD AUTO: 0.03 K/UL — SIGNIFICANT CHANGE UP (ref 0–0.2)
BASOPHILS NFR BLD AUTO: 0.3 % — SIGNIFICANT CHANGE UP (ref 0–2)
BENZODIAZ UR-MCNC: NEGATIVE — SIGNIFICANT CHANGE UP
BILIRUB SERPL-MCNC: 0.4 MG/DL — SIGNIFICANT CHANGE UP (ref 0.2–1.2)
BLD GP AB SCN SERPL QL: NEGATIVE — SIGNIFICANT CHANGE UP
BUN SERPL-MCNC: 14 MG/DL — SIGNIFICANT CHANGE UP (ref 7–23)
CALCIUM SERPL-MCNC: 9.6 MG/DL — SIGNIFICANT CHANGE UP (ref 8.4–10.5)
CHLORIDE SERPL-SCNC: 100 MMOL/L — SIGNIFICANT CHANGE UP (ref 98–107)
CO2 SERPL-SCNC: 26 MMOL/L — SIGNIFICANT CHANGE UP (ref 22–31)
COCAINE METAB.OTHER UR-MCNC: NEGATIVE — SIGNIFICANT CHANGE UP
CREAT SERPL-MCNC: 1.28 MG/DL — SIGNIFICANT CHANGE UP (ref 0.5–1.3)
CREATININE URINE RESULT, DAU: 60 MG/DL — SIGNIFICANT CHANGE UP
EGFR: 66 ML/MIN/1.73M2 — SIGNIFICANT CHANGE UP
EOSINOPHIL # BLD AUTO: 0.01 K/UL — SIGNIFICANT CHANGE UP (ref 0–0.5)
EOSINOPHIL NFR BLD AUTO: 0.1 % — SIGNIFICANT CHANGE UP (ref 0–6)
FENTANYL UR QL SCN: NEGATIVE — SIGNIFICANT CHANGE UP
GLUCOSE SERPL-MCNC: 121 MG/DL — HIGH (ref 70–99)
HCT VFR BLD CALC: 42.3 % — SIGNIFICANT CHANGE UP (ref 39–50)
HGB BLD-MCNC: 13.7 G/DL — SIGNIFICANT CHANGE UP (ref 13–17)
IANC: 9.26 K/UL — HIGH (ref 1.8–7.4)
IMM GRANULOCYTES NFR BLD AUTO: 1.8 % — HIGH (ref 0–0.9)
INR BLD: 1.14 RATIO — SIGNIFICANT CHANGE UP (ref 0.85–1.16)
LIDOCAIN IGE QN: 16 U/L — SIGNIFICANT CHANGE UP (ref 7–60)
LYMPHOCYTES # BLD AUTO: 0.68 K/UL — LOW (ref 1–3.3)
LYMPHOCYTES # BLD AUTO: 6.5 % — LOW (ref 13–44)
MCHC RBC-ENTMCNC: 22.1 PG — LOW (ref 27–34)
MCHC RBC-ENTMCNC: 32.4 G/DL — SIGNIFICANT CHANGE UP (ref 32–36)
MCV RBC AUTO: 68.3 FL — LOW (ref 80–100)
METHADONE UR-MCNC: NEGATIVE — SIGNIFICANT CHANGE UP
MONOCYTES # BLD AUTO: 0.37 K/UL — SIGNIFICANT CHANGE UP (ref 0–0.9)
MONOCYTES NFR BLD AUTO: 3.5 % — SIGNIFICANT CHANGE UP (ref 2–14)
NEUTROPHILS # BLD AUTO: 9.26 K/UL — HIGH (ref 1.8–7.4)
NEUTROPHILS NFR BLD AUTO: 87.8 % — HIGH (ref 43–77)
NRBC # BLD: 0 /100 WBCS — SIGNIFICANT CHANGE UP (ref 0–0)
NRBC # FLD: 0 K/UL — SIGNIFICANT CHANGE UP (ref 0–0)
OPIATES UR-MCNC: NEGATIVE — SIGNIFICANT CHANGE UP
OXYCODONE UR-MCNC: NEGATIVE — SIGNIFICANT CHANGE UP
PCP SPEC-MCNC: SIGNIFICANT CHANGE UP
PCP UR-MCNC: NEGATIVE — SIGNIFICANT CHANGE UP
PLATELET # BLD AUTO: 216 K/UL — SIGNIFICANT CHANGE UP (ref 150–400)
POTASSIUM SERPL-MCNC: 3.4 MMOL/L — LOW (ref 3.5–5.3)
POTASSIUM SERPL-SCNC: 3.4 MMOL/L — LOW (ref 3.5–5.3)
PROT SERPL-MCNC: 8.1 G/DL — SIGNIFICANT CHANGE UP (ref 6–8.3)
PROTHROM AB SERPL-ACNC: 13.2 SEC — SIGNIFICANT CHANGE UP (ref 9.9–13.4)
RBC # BLD: 6.19 M/UL — HIGH (ref 4.2–5.8)
RBC # FLD: 15.6 % — HIGH (ref 10.3–14.5)
RH IG SCN BLD-IMP: POSITIVE — SIGNIFICANT CHANGE UP
SODIUM SERPL-SCNC: 139 MMOL/L — SIGNIFICANT CHANGE UP (ref 135–145)
THC UR QL: NEGATIVE — SIGNIFICANT CHANGE UP
TROPONIN T, HIGH SENSITIVITY RESULT: 10 NG/L — SIGNIFICANT CHANGE UP
WBC # BLD: 10.54 K/UL — HIGH (ref 3.8–10.5)
WBC # FLD AUTO: 10.54 K/UL — HIGH (ref 3.8–10.5)

## 2024-10-31 PROCEDURE — 70498 CT ANGIOGRAPHY NECK: CPT | Mod: 26,MC

## 2024-10-31 PROCEDURE — 99291 CRITICAL CARE FIRST HOUR: CPT | Mod: 25

## 2024-10-31 PROCEDURE — 0042T: CPT | Mod: MC

## 2024-10-31 PROCEDURE — 99223 1ST HOSP IP/OBS HIGH 75: CPT

## 2024-10-31 PROCEDURE — 70496 CT ANGIOGRAPHY HEAD: CPT | Mod: 26,MC

## 2024-10-31 PROCEDURE — 70450 CT HEAD/BRAIN W/O DYE: CPT | Mod: 26,MC,59

## 2024-10-31 RX ORDER — HEPARIN SODIUM 10000 [USP'U]/ML
5000 INJECTION INTRAVENOUS; SUBCUTANEOUS EVERY 8 HOURS
Refills: 0 | Status: DISCONTINUED | OUTPATIENT
Start: 2024-10-31 | End: 2024-11-04

## 2024-10-31 RX ORDER — INFLUENZ VIR VAC TV P-SURF2003 15MCG/.5ML
0.5 SYRINGE (ML) INTRAMUSCULAR ONCE
Refills: 0 | Status: DISCONTINUED | OUTPATIENT
Start: 2024-10-31 | End: 2024-11-04

## 2024-10-31 RX ORDER — POTASSIUM CHLORIDE 10 MEQ
40 TABLET, EXTENDED RELEASE ORAL ONCE
Refills: 0 | Status: COMPLETED | OUTPATIENT
Start: 2024-10-31 | End: 2024-10-31

## 2024-10-31 RX ORDER — ONDANSETRON HYDROCHLORIDE 2 MG/ML
4 INJECTION, SOLUTION INTRAMUSCULAR; INTRAVENOUS EVERY 6 HOURS
Refills: 0 | Status: DISCONTINUED | OUTPATIENT
Start: 2024-10-31 | End: 2024-11-04

## 2024-10-31 RX ORDER — CLOPIDOGREL 75 MG/1
300 TABLET ORAL ONCE
Refills: 0 | Status: COMPLETED | OUTPATIENT
Start: 2024-10-31 | End: 2024-10-31

## 2024-10-31 RX ORDER — ACETAMINOPHEN 500 MG
650 TABLET ORAL EVERY 6 HOURS
Refills: 0 | Status: DISCONTINUED | OUTPATIENT
Start: 2024-10-31 | End: 2024-11-04

## 2024-10-31 RX ORDER — MAGNESIUM SULFATE IN 0.9% NACL 2 G/50 ML
2 INTRAVENOUS SOLUTION, PIGGYBACK (ML) INTRAVENOUS ONCE
Refills: 0 | Status: COMPLETED | OUTPATIENT
Start: 2024-10-31 | End: 2024-10-31

## 2024-10-31 RX ORDER — MECLIZINE HCL 25 MG
25 TABLET ORAL ONCE
Refills: 0 | Status: COMPLETED | OUTPATIENT
Start: 2024-10-31 | End: 2024-10-31

## 2024-10-31 RX ORDER — SODIUM CHLORIDE 9 MG/ML
1000 INJECTION, SOLUTION INTRAMUSCULAR; INTRAVENOUS; SUBCUTANEOUS ONCE
Refills: 0 | Status: COMPLETED | OUTPATIENT
Start: 2024-10-31 | End: 2024-10-31

## 2024-10-31 RX ORDER — CLOPIDOGREL 75 MG/1
75 TABLET ORAL DAILY
Refills: 0 | Status: DISCONTINUED | OUTPATIENT
Start: 2024-11-01 | End: 2024-11-01

## 2024-10-31 RX ORDER — ASPIRIN/MAG CARB/ALUMINUM AMIN 325 MG
81 TABLET ORAL DAILY
Refills: 0 | Status: DISCONTINUED | OUTPATIENT
Start: 2024-11-01 | End: 2024-11-04

## 2024-10-31 RX ORDER — ONDANSETRON HYDROCHLORIDE 2 MG/ML
4 INJECTION, SOLUTION INTRAMUSCULAR; INTRAVENOUS ONCE
Refills: 0 | Status: COMPLETED | OUTPATIENT
Start: 2024-10-31 | End: 2024-10-31

## 2024-10-31 RX ORDER — DIBASIC SODIUM PHOSPHATE, MONOBASIC POTASSIUM PHOSPHATE AND MONOBASIC SODIUM PHOSPHATE 852; 155; 130 MG/1; MG/1; MG/1
1 TABLET ORAL ONCE
Refills: 0 | Status: COMPLETED | OUTPATIENT
Start: 2024-10-31 | End: 2024-10-31

## 2024-10-31 RX ORDER — ASPIRIN/MAG CARB/ALUMINUM AMIN 325 MG
324 TABLET ORAL ONCE
Refills: 0 | Status: COMPLETED | OUTPATIENT
Start: 2024-10-31 | End: 2024-10-31

## 2024-10-31 RX ADMIN — Medication 80 MILLIGRAM(S): at 12:56

## 2024-10-31 RX ADMIN — SODIUM CHLORIDE 1000 MILLILITER(S): 9 INJECTION, SOLUTION INTRAMUSCULAR; INTRAVENOUS; SUBCUTANEOUS at 07:44

## 2024-10-31 RX ADMIN — ONDANSETRON HYDROCHLORIDE 4 MILLIGRAM(S): 2 INJECTION, SOLUTION INTRAMUSCULAR; INTRAVENOUS at 03:24

## 2024-10-31 RX ADMIN — Medication 40 MILLIEQUIVALENT(S): at 09:20

## 2024-10-31 RX ADMIN — SODIUM CHLORIDE 1000 MILLILITER(S): 9 INJECTION, SOLUTION INTRAMUSCULAR; INTRAVENOUS; SUBCUTANEOUS at 03:24

## 2024-10-31 RX ADMIN — Medication 25 MILLIGRAM(S): at 05:14

## 2024-10-31 RX ADMIN — Medication 25 GRAM(S): at 07:44

## 2024-10-31 RX ADMIN — CLOPIDOGREL 300 MILLIGRAM(S): 75 TABLET ORAL at 06:27

## 2024-10-31 RX ADMIN — HEPARIN SODIUM 5000 UNIT(S): 10000 INJECTION INTRAVENOUS; SUBCUTANEOUS at 22:11

## 2024-10-31 RX ADMIN — Medication 324 MILLIGRAM(S): at 06:27

## 2024-10-31 RX ADMIN — HEPARIN SODIUM 5000 UNIT(S): 10000 INJECTION INTRAVENOUS; SUBCUTANEOUS at 13:49

## 2024-10-31 RX ADMIN — Medication 75 MILLILITER(S): at 13:58

## 2024-10-31 RX ADMIN — Medication 75 MILLILITER(S): at 21:15

## 2024-10-31 NOTE — H&P ADULT - PROBLEM SELECTOR PLAN 1
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  CTH without acute bleed or infarct.   CTP diagnostic quality w/   35 mL in the left occipital lobe/PCA territory, no core infarct.   CTA w/ left ICA supraclinoid mod stenosis, prox left PCA severe stenosis vs partial occlusion.   In the ED patient was evaluated by neurology and evaluated by INR Fellow and attending Dr. Sanches.   No role for emergent intervention in patient with concern for partially occlusive left PCA in the setting of chronic PCA stenosis; low NIHSS of 2; minimal penumbra territory on CT perfusion due to low benefit/risk ratio.   Patient was loaded with ASA and plavix in the ED, plan to continue aspirin 81mg daily in additional plavix 75mg daily for 21 days  Continue lipitor 80mg daily  f/u MRI/MRA brain  f/u TTE with bubble study  Appreciate PT/OT eval  f/u S/S eval  f/u lipid panel, A1C  Maintain permission hypertension up to 220/110 for 24-48h from symptom onset followed by gradual normotension over 2-3 days   Maintain neuro checks q4h, tele monitoring

## 2024-10-31 NOTE — OCCUPATIONAL THERAPY INITIAL EVALUATION ADULT - THERAPY FREQUENCY, OT EVAL
1-2x/week Plastic Surgeon Procedure Text (A): After obtaining clear surgical margins the patient was sent to plastics for surgical repair.  The patient understands they will receive post-surgical care and follow-up from the referring physician's office.

## 2024-10-31 NOTE — CONSULT NOTE ADULT - NSCONSULTADDITIONALINFOA_GEN_ALL_CORE
54yo Creole speaking RHM with h/o right basal ganglia stroke, HTN, presenting with acute onset nausea, vomiting and gait imbalance. In the ED, he was found to have R homonymous hemianopia and L P1 occlusion and therefore was admitted for stroke evaluation. However, patient is a poor historian and was not able to answer much. Today, he did say that he had a previous stroke but didn't know nay more details. CT imaging showed L PCA and R thalamic infarcts that are not acute. Patient does have atherosclerotic disease as evidenced by the occluded L P1 and right thalamic stroke which is liley 2/2 . Would obtain MRI to investigate further. Stroke fellow addendum 10/31/24:  52yo Creole speaking RHM with h/o right basal ganglia stroke, HTN, presenting with acute onset nausea, vomiting and gait imbalance. In the ED, he was found to have R homonymous hemianopia and L P1 occlusion and therefore was admitted for stroke evaluation. However, patient is a poor historian and was not able to answer much. Today, he did say that he had a previous stroke but didn't know nay more details. CT imaging showed L PCA and R thalamic infarcts that are not acute. Patient does have atherosclerotic disease as evidenced by the occluded L P1 and right thalamic stroke which is liley 2/2 . Would obtain MRI to investigate further.

## 2024-10-31 NOTE — ED PROVIDER NOTE - PRIOR EKG STATUS
Cont 4mg daily - repeat in 1 week - try to keep diet consistent
I called pt at [phone] and verified 2 patient identifiers: name & . I discussed results and recommendations at length with patient. All orders placed. All questions answered.
Recent INR history. 12/17/19 INR 2.27:  Pt was instructed to take an extra 2 mg on 12/9 so a total of 6mg and then 4mg daily.   Pt took the extra 2mg on 12/9 but then went back to her regular dose of 4mg all days except for 3mg on wed (29mg for the wee
the EKG is unchanged from prior EKG

## 2024-10-31 NOTE — SWALLOW BEDSIDE ASSESSMENT ADULT - SWALLOW EVAL: DIAGNOSIS
1. Mild oral dysphagia for puree, regular solids, mildly thick liquids and thin liquids characterized by slow mastication, suspect piecemeal transfer/ deglutition with adequate oral clearance. 2. Functional pharyngeal stage suspected for puree, regular solids and mildly thick liquids characterized by initiation of the pharyngeal swallow and hyolaryngeal excursion upon digital palpation with no overt s/s penetration/ aspiration noted. 3. Mild-Moderate pharyngeal dysphagia suspected for thin liquids characterized by initiation of the pharyngeal swallow and hyolaryngeal excursion upon digital palpation with inconsistent throat clearing across 12 oz.

## 2024-10-31 NOTE — ED PROVIDER NOTE - IV ALTEPLASE EXCL REL HIDDEN
Anesthesia Pre Eval Note    Anesthesia ROS/Med Hx        Anesthetic Complication History:    Patient does not have a history of anesthetic complications      Pulmonary Review:  Patient does not have a pulmonary history      Neuro/Psych Review:  Patient does not have a neuro/psych history         Cardiovascular Review:     Positive for hyperlipidemia    GI/HEPATIC/RENAL Review:     Positive for GERD  Positive for renal disease    End/Other Review:  Patient does not have an endo/other history    Relevant Problems   No relevant active problems       Physical Exam     Airway   Mallampati: II  TM Distance: >3 FB  Neck ROM: Full  Neck: Able to place in sniff position  TMJ Mobility: Good    Cardiovascular  Cardiovascular exam normal  Cardio Rhythm: Regular  Cardio Rate: Normal    Head Assessment  Head assessment: Normocephalic and Atraumatic    General Assessment  General Assessment: Alert and oriented and No acute distress    Dental Exam    Patient has:  Denied broken/chipped/loose teeth    Pulmonary Exam  Pulmonary exam normal  Breath sounds clear to auscultation:  Yes    Anesthesia Plan:    ASA Status: 2  Anesthesia Type: MAC    Induction: Intravenous  Maintenance: TIVA  Premedication: None      Post-op Pain Management: Per Surgeon      Checklist  Reviewed: NPO Status, Allergies, Medications, Problem list, Past Med History and Patient Summary  Consent/Risks Discussed Statement:  The proposed anesthetic plan, including its risks and benefits, have been discussed with the Patient along with the risks and benefits of alternatives. Questions were encouraged and answered and the patient and/or representative understands and agrees to proceed.        I discussed with the patient (and/or patient's legal representative) the risks and benefits of the proposed anesthesia plan, MAC, which may include services performed by other anesthesia providers.    Alternative anesthesia plans, if available, were reviewed with the patient  (and/or patient's legal representative). Discussion has been held with the patient (and/or patient's legal representative) regarding risks of anesthesia, which include Intra-operative Awareness and emergent situations that may require change in anesthesia plan.    The patient (and/or patient's legal representative) has indicated understanding, his/her questions have been answered, and he/she wishes to proceed with the planned anesthetic.    Blood Products: Not Anticipated   show

## 2024-10-31 NOTE — H&P ADULT - TIME BILLING
90 minutes of total time dedicated to this patient visit today including preparing to see the patient (eg. review of tests), obtaining and/or reviewing separately obtained history, obtaining/reviewing vitals, performing a medically appropriate examination and/or evaluation, counseling and educating the patient/family/caregiver, reviewing previous notes and test results, and procedures, communicating with other health professionals (when not separately reported), and documenting clinical information in the electronic health record. This time excludes teaching and separately reported services.

## 2024-10-31 NOTE — ED PROVIDER NOTE - ATTENDING APP SHARED VISIT CONTRIBUTION OF CARE
53-year-old male with a past medical history of hypertension also takes baby aspirin no other thinners states that he was eating when he suddenly had a feeling that he was unwell with multiple episodes of vomiting.  Patient states that this happened around 6 PM last night.  He denies any chest pain any shortness of breath.  He states that he feels a little bit better laying flat but he is still symptomatic with this.  Patient noted to have marked nystagmus, inability to ambulate due to dizziness, patient leaning slightly to the right.  No dysmetria moving all extremities strength intact sensation intact.  Patient is out of the window for tenecteplase however still within 24 hours therefore code stroke was called.     I have personally provided 60 minutes of critical care time. This time excludes time spent on separate procedures and time spent teaching. I have reviewed the documentation and I agree with the assessment and plan of care unless otherwise noted.    Creole [id number]

## 2024-10-31 NOTE — ED PROVIDER NOTE - PHYSICAL EXAMINATION
Vitals signed reviewed  General: Well appearing, NAD  HEENT: NC/AT, PERRLA, EOM intact with no pain, MMM  Neck: full ROM, no trachea deviation  Heart: RRR, normal s1/s2  Lungs: CTAB, normal WOB, no wheezing, rales, or rhonchi  Abdomen: Soft, non-distended, non-tender, no CVA tenderness , rebounding or guarding  MSK:    normal gait, full ROM with no tenderness, no swelling, redness, instability  Neuro: CN II-XII intact. no sensory/motor deficits    + Bidirectional Nystagmus

## 2024-10-31 NOTE — PATIENT PROFILE ADULT - DO YOU FEEL UNSAFE AT HOME, WORK, OR SCHOOL?
Please order CBC, CMP, TSH, CHER, ESR, CRP, Rheumatoid Factor and Lymes IgG and IgM antibody screen.   no

## 2024-10-31 NOTE — SWALLOW BEDSIDE ASSESSMENT ADULT - SLP PATIENT PROFILE REVIEW
H&P 10/31, "53 year old PMH HTN, CVA presents c/o nausea, NBNB vomiting, and dizziness 30 minutes after eating African food last night at 6pm since resolved. In the ED, patient was noted to have marked left beating nystagmus and code stroke was called. CTH without acute bleed or infarct. CTP diagnostic quality w/   35 mL in the left occipital lobe/PCA territory, no core infarct. CTA w/ left ICA supraclinoid mod stenosis, prox left PCA severe stenosis vs partial occlusion. Consider vertigo and right homonymous hemianopia 2/2 left posterior dysfunction due to occlusions (ESUS) vs intracranial atherosclerotic disease."/yes

## 2024-10-31 NOTE — SWALLOW BEDSIDE ASSESSMENT ADULT - SWALLOW EVAL: RECOMMENDED FEEDING/EATING TECHNIQUES
small bites; small single cup sips; slow rate of intake/oral hygiene/position upright (90 degrees)/small sips/bites

## 2024-10-31 NOTE — CONSULT NOTE ADULT - ATTENDING COMMENTS
Mr. Painter is a very pleasant 53-year-old Creole speaking right-handed man who presents for sudden onset of dizziness.  He has had previous ischemic stroke right basal ganglia, left PCA infarct noted on noncontrast head CT.  He has no focal neurological deficit.  The translation was done with the help of medical student Miquel.  He will need a thorough stroke in young workup including MRI brain, echocardiogram.  His vascular imaging demonstrates left PCA occlusion although there is no evidence of severe stenosis intracranial extracranial circulation.  Impression:?  Posterior circulation TIA/ischemia  Obtain MRI brain without contrast to look at the extent and distribution of the stroke.   Continue with aspirin and clopidogrel for aggressive secondary stroke prevention considering the results of [facility] stroke trial.   Agree with Atorvastatin 80 mg at bedtime, titrate the dose according to LDL.   TTE with bubble study and continue with telemetry to screen for the cardiac source of embolism.     Agree with permissive HTN up to 220/120 mmHg for first 24 hours after the symptom onset followed by gradual normotension.     HbA1C and LDL.     PT/OT/Speech and swallow/safety eval.    Above mentioned plan was discussed with patient and family member in detail. All the questions were answered and concerns were addressed. Mr. Painter is a very pleasant 53-year-old Creole speaking right-handed man who presents for sudden onset of dizziness.  He has had previous ischemic stroke right basal ganglia, left PCA infarct noted on noncontrast head CT.  He has partial left homonymous hemianopia.  The translation was done with the help of medical student Miquel.  He will need a thorough stroke in young workup including MRI brain, echocardiogram.  His vascular imaging demonstrates left PCA occlusion although there is no evidence of severe stenosis intracranial extracranial circulation.  Impression:?  Posterior circulation TIA/ischemia  Obtain MRI brain without contrast to look at the extent and distribution of the stroke.   Continue with aspirin and clopidogrel for aggressive secondary stroke prevention considering the results of [facility] stroke trial.   Agree with Atorvastatin 80 mg at bedtime, titrate the dose according to LDL.   TTE with bubble study and continue with telemetry to screen for the cardiac source of embolism.     Agree with permissive HTN up to 220/120 mmHg for first 24 hours after the symptom onset followed by gradual normotension.     HbA1C and LDL.     PT/OT/Speech and swallow/safety eval.    Above mentioned plan was discussed with patient and family member in detail. All the questions were answered and concerns were addressed.

## 2024-10-31 NOTE — CONSULT NOTE ADULT - ASSESSMENT
~~~~~~~~~~~~~~~~~~~~~~~~~~~~~~~~~~~~~~~~~~~~~~~~~~~~~~~~~~~  Assessment:  -53yoM PMHx right basal ganglia stroke, mild residual deficits, HTN, unclear medications  -presents feeling unwell. Code stroke for unsteadiness and nystagmus. Improves but not resolves when still.   -VSS -167/, HR 65-70, RR 15, satting well on RA. Afberile. FSBG 120  -Exam with marked left beating/left rotart nystagmus on primary and left gaze, improves on right gaze. Varying from partial to complete hemianopia   -Labs w/ mild neutrophilic predominant leukocytosis, microcytosis without anemia, mild hypokalemia. Trop wnl x1. Lipase 16.  -CTH w/  -CTP w/  -CTA w/    Impression:  -Vertigo   -RHH    Recommendations:  TBD    Plan NOT YET discussed with attending     ~~~~~~~~~~~~~~~~~~~~~~~~~~~~~~~~~~~~~~~~~~~~~~~~~~~~~~~~~~~  Assessment:  -53yoM PMHx right basal ganglia stroke, mild residual deficits, HTN, unclear medications  -presents feeling unwell. Code stroke for unsteadiness and nystagmus. Improves but not resolves when still.   -VSS -167/, HR 65-70, RR 15, satting well on RA. Afberile. FSBG 120  -Exam with marked left beating/left rotart nystagmus on primary and left gaze, improves on right gaze. Varying from partial to complete hemianopia   -Labs w/ mild neutrophilic predominant leukocytosis, microcytosis without anemia, mild hypokalemia. Trop wnl x1. Lipase 16.  -CTH w/ left occipital lobe hypodensity. Rads read noting encephalomalacia.   -CTP diagnostic quality w/   35 mL in the left occipital lobe/PCA territory, no core infarct  -CTA w/ mild athero b/l ICA in neck and siphons, no stenosis. Left ICA supralincoid mod stenosis. Prox left PCA severe stenosis vs partial occlusion. Left vert hypoplastic. Question if left SCA also involved.     Impression:  -Vertigo and right homonymous hemianopia localizing to left posterior dysfunction due to ICAD vs occlusions.    Recommendations:  -No tenecteplase because outside window, no intervention per MARGARETH due to high risk    Stroke acute management:  [] Frequent neuro-checks q4h and VS q4h; STAT CTH for change in neuro exam  [] Permissive HTN up to 220/110 for 24-48h from symptom onset followed by gradual normotension over 2-3 days   [] Please bolus for now and increase blood pressure as tolerated  [] IVFs to maintain hydration while NPO  [] NPO unless passes dysphagia screen; swallow eval if fails  [] DVT ppx: Enoxaparin sq daily +  SCDs    Secondary prevention of stroke:  []Aspirin 324mg x 1 now   []Clopidogrel 300mg x 1now   []Aspirin 81mg PO daily (325 per rectum if fails dysphagia)   []Clopidogrel 75 mg PO daily for 21 days   []Atorvastatin 80 mg PO daily (long-term goal LDL < 70)  []Tight glucose control (long-term goal HgbA1c < 6%)  []Rehabilitation: PT/OT/S+S/SW/CM consults    Stroke workup:  []MRI brain w/o contrast,  []MRA neck with contrast  []MRA head noncon  -consideration for MRA nova at later date   []TTE with bubble   []Telemetry to monitor for arrhythmia; Consider implantable loop recorder depending on clinical course  []Check HgbA1C, fasting lipid panel, utox    Case discussed with  attending Dr. Allan Shaw     ~~~~~~~~~~~~~~~~~~~~~~~~~~~~~~~~~~~~~~~~~~~~~~~~~~~~~~~~~~~  Assessment:  -53yoM PMHx right basal ganglia stroke, mild residual deficits, HTN, unclear medications  -presents feeling unwell. Code stroke for unsteadiness and nystagmus. Improves but not resolves when still.   -VSS -167/, HR 65-70, RR 15, satting well on RA. Afberile. FSBG 120  -Exam with marked left beating/left rotart nystagmus on primary and left gaze, improves on right gaze. Varying from partial to complete hemianopia   -Labs w/ mild neutrophilic predominant leukocytosis, microcytosis without anemia, mild hypokalemia. Trop wnl x1. Lipase 16.  -CTH w/ left occipital lobe hypodensity. Rads read noting encephalomalacia.   -CTP diagnostic quality w/   35 mL in the left occipital lobe/PCA territory, no core infarct  -CTA w/ mild athero b/l ICA in neck and siphons, no stenosis. Left ICA supralincoid mod stenosis. Prox left PCA severe stenosis vs partial occlusion. Left vert hypoplastic. Question if left SCA also involved.     Impression:  -Vertigo and right homonymous hemianopia localizing to left posterior dysfunction due to ICAD vs occlusions (ESUS)    Recommendations:  -No tenecteplase because outside window, no intervention per MARGARETH due to high risk    Stroke acute management:  [] Frequent neuro-checks q4h and VS q4h; STAT CTH for change in neuro exam  [] Permissive HTN up to 220/110 for 24-48h from symptom onset followed by gradual normotension over 2-3 days   [] Please bolus for now and increase blood pressure as tolerated  [] IVFs to maintain hydration while NPO  [] NPO unless passes dysphagia screen; swallow eval if fails  [] DVT ppx: Enoxaparin sq daily +  SCDs    Secondary prevention of stroke:  []Aspirin 324mg x 1 now   []Clopidogrel 300mg x 1now   []Aspirin 81mg PO daily (325 per rectum if fails dysphagia)   []Clopidogrel 75 mg PO daily for 21 days   []Atorvastatin 80 mg PO daily (long-term goal LDL < 70)  []Tight glucose control (long-term goal HgbA1c < 6%)  []Rehabilitation: PT/OT/S+S/SW/CM consults    Stroke workup:  []MRI brain w/o contrast,  []MRA neck with contrast  []MRA head noncon  -consideration for MRA nova at later date   []TTE with bubble   []Telemetry to monitor for arrhythmia; Consider implantable loop recorder depending on clinical course  []Check HgbA1C, fasting lipid panel, utox    Case discussed with  attending Dr. Allan Shaw

## 2024-10-31 NOTE — SWALLOW BEDSIDE ASSESSMENT ADULT - ASR SWALLOW RECOMMEND DIAG
Donor Site Anesthesia Type: same as repair anesthesia Second Skin Substitute Units (Will Override Primary Defect Units If Greater Than 0): 0 Quadrant Reporting?: no Bi-Rhombic Flap Text: Because of orientation and full-thickness nature of the defect, a rhombic transposition flap was planned. After  prep and anesthesia, two rhombic flaps were carefully incised to straddle relaxed skin tension lines and the anticipated Burow's triangle were removed. The flap was raised and the wound edges were all undermined in the subcutaneous plane. After hemostasis, the flaps were transposed into the defect and sutured in a layered fashion. Asc Procedure Text (C): After obtaining clear surgical margins the patient was sent to an ASC for surgical repair.  The patient understands they will receive post-surgical care and follow-up from the ASC physician. V-Y Plasty Text: Because of the full-thickness nature of the wound and to preserve nearby structures, a V-toY Advancement flap was planned. After prep and local anesthesia, a Burow’s triangle was excised adjacent to the defect.  Opposite from this, two smaller Burow’s triangles were excised.  Three flaps were created by undermining in the deep subcutaneous plane. After hemostasis, the flaps were advanced and closed in a layered fashion. Follicular Atypia Histology Text: Follicular atypia was noted and reviewed with patient, patient was advised to monitor and report any skin changes. M-Plasty Intermediate Repair Preamble Text (Leave Blank If You Do Not Want): Undermining was performed with blunt dissection. Hatchet Flap Text: Because of the full-thickness nature of the defect and to avoid deformity of free margin of the ala, and after full discussion of the spectrum of repair options, a dorsal nasal rotation flap was planned.  After prep and anesthesia, a Burow’s triangle was excised from the lateral portion superiorly on the nasal sidewall towards the inner canthus and an incision extended from the inferior margin of the wound across the nose and sidewall, then extended superiorly along the nasofacial sulcus and medial cheek through the inner canthus to the glabella where a back cut was made to the contralateral inner canthus.  The flap was elevated by skeletonizing the nasal root, dorsum, and sidewalls.  After hemostasis obtained, the flap was rotated into place, trimmed to fit the defect, and sutured in a layered fashion. Zygomaticofacial Flap Text: Because of full-thickness of the defect and to avoid distortion of the lower eyelid, a full cheek rotation flap was planned. After prep and anesthesia, a large Burow’s triangle was excised inferiorly.  An incision was made from the superior portion of the wound over the orbital rim, curving upward onto the temple, then downward along the preauricular crease and below the ear lobule where another Burow’s triangle was excised.  The full cheek flap was elevated and the wound edges were undermined in the subcutaneous plane. After hemostasis, the flap was rotated into place, trimmed at the tip, suspended with a periosteal stitch from the orbital rim, and sutured in a a layered fashion. Burow's Advancement Flap Text: Because of the full-thickness nature of the wound and in order to displace lines around important structures, a Burow’s advancement flap was planned. After prep and local anesthesia, a Burow’s triangle was excised adjacent to the defect.  The other Burow's triangle was displaced to hide incisions within skin relaxation lines. Two flaps were created by undermining in the deep subcutaneous plane. After hemostasis, the flaps were advanced and closed in a layered fashion. Cinesophagram to objectively assess swallow function to determine if throat clear is dysphagia/ aspiration related and if so determine if compensatory strategy benefit to maintain airway protection/VFSS/MBS Eyelid Partial Thickness Repair - 03545: The eyelid defect was partial thickness which required a wedge repair of the eyelid. Special care was taken to ensure that the eyelid margin was realligned when placing sutures. Where Do You Want The Question To Include Opioid Counseling Located?: Case Summary Tab Was The Patient On Physician Recommended Anticoagulation Therapy?: Please Select the Appropriate Response Show Biopsy Photo Reviewed Variable: Yes Scc Sarcomatoid Histology Text: There were aggregates of squamous cells in a sarcomatous pattern. Plastic Surgeon Procedure Text (E): After obtaining clear surgical margins the patient was sent to plastics for surgical repair.  The patient understands they will receive post-surgical care and follow-up from the referring physician's office. Composite Graft Text: In order to decrease risk for distortion of the alar rim, a full-thickness skin graft with cartilage graft was planned. After prep and local anesthesia, a template was made of the defect and the graft was harvested from the conchal bowl. After the graft was harvested, a strip of exposed cartilage was also elevated from the conchal bowl.  The cartilage was either inset into pockets on either side of the defect, or minced and placed at the base of the wound.  The skin graft was then defatted and trimmed to fit the defect. It was sutured into place circumferentially and a tie-over Bolster dressing was applied.  Hemostasis was obtained at the donor site which was then bandaged to heal by second intention. Abbe Flap (Lower To Upper Lip) Text: The defect of the upper lip was assessed and measured.  Given the location and size of the defect, an Abbe flap was deemed most appropriate.  Using a sterile surgical marker, an appropriate Abbe flap was measured and drawn on the lower lip. Local anesthesia was then infiltrated. A scalpel was then used to incise the upper lip through and through the skin, vermilion, muscle and mucosa, leaving the flap pedicled on the opposite side.  The flap was then rotated and transferred to the lower lip defect.  The flap was then sutured into place with a three layer technique, closing the orbicularis oris muscle layer with subcutaneous buried sutures, followed by a mucosal layer and an epidermal layer. Anesthesia Volume In Cc: 3.8 Mohs Histo Method Verbiage: Each section was then chromacoded and processed in the Mohs lab using the Mohs protocol and submitted for frozen section. Stage 6: Additional Anesthesia Type: 1% lidocaine with 1:100,000 epinephrine and 408mcg clindamycin/ml and a 1:10 solution of 8.4% sodium bicarbonate Surgeon Performing Repair (Optional): Dr. Mor Huizar Sox10 - Negative Histology Text: SOX10 staining demonstrates a normal density and pattern of melanocytes along the dermal-epidermal junction. The surgical margins are negative for tumor cells. Z Plasty Text: In order to reduce appearance and discomfort related to the web, a Z-plasty was designed.  Aftert prep and anesthesia, a horizontal incision was made across the web.  A double transposition flap was incised in a Z-plasty fashion.  The wound margins were widely undermined to elevate two flaps of skin.  After hemostasis was obtained, the flaps were transposed into place, and sutured in a a layered fashion. Scc Pigmented Histology Text: There were aggregates of squamous cells. Island Pedicle Flap With Canthal Suspension Text: In order to avoid distortion of nearby structures, an island pedicle flap was planned.  After prep and local anesthesia, a triangular incision was made.  The flap was dissected to a muscular subcutaneous pedicle.  The skin surrounding the flap was undermined to allow for closure of the donor-site defect. After hemostasis obtained, the flap was advanced into place and sutured in a layered fashion.   suspension suture was placed in the canthal tendon to prevent tension and prevent ectropion. Unique Flap 1 Text: Because of the full-thickness nature of the defect, and to maintain form and function of the nose, and the proximity to the nasal tip and ala, a bilateral advancement flap was carefully planned. After anesthesia and prep, a Burow's triangle was excised above the defect up to the nasal root, and a Burow’s triangle displaced centrally and excised below the defect down to the columella.  Two laterally-based flaps were created by undermining at the level of the periosteum and perichondrium skeletonizing the nasal tip, dorsum and sidewalls.  After hemostasis, a tension-reduction stitch was placed at the level of the distal nasal bone, and the flaps were sutured together in a layered fashion. Staged Advancement Flap Text: Because of the full-thickness nature of the wound and in order to displace lines around important structures, a Burow’s advancement flap was planned. After prep and local anesthesia, a Burow’s triangle was excised adjacent to the defect.  The other Burow's triangle was displaced to hide incisions within skin relaxation lines. Two flaps were created by undermining in the deep subcutaneous plane. After hemostasis, the flaps were advanced and closed in a layered fashion. This is a staged repair and the patient will return for additional surgery. Island Pedicle Flap-Requiring Vessel Identification Text: Due to the location on free margin and to restore and maintain airway, an alar IPF was planned.  Given the location of the defect, shape of the defect and the proximity to free margins an island pedicle advancement flap was deemed most appropriate.  Using a sterile surgical marker, an appropriate advancement flap was drawn, based on the axial vessel mentioned above, incorporating the defect, outlining the appropriate donor tissue and placing the expected incisions within the relaxed skin tension lines where possible.    The area thus outlined was incised deep to adipose tissue with a #15 scalpel blade.  The skin margins were undermined to an appropriate distance in all directions around the primary defect and laterally outward around the island pedicle utilizing iris scissors.  There was minimal undermining beneath the pedicle flap. Malignant Blue Nevus Histology Text: Proliferation of MART-1+ cells. Mercedes Flap Text: Because of the full-thickness nature of the defect and tightness of surrounding skin, a triple-advancement flap was planned.  After prep and local anesthesia, three Burow’s triangles were excised adjacent to the defect.  The wound edges were widely undermined to raise three flaps in the deep subcutaneous plane.  Hemostasis was achieved.  The flaps were then advanced into the defect and sutured into place. Bcc Histology Text: There were aggregates of basaloid cells. Consent (Marginal Mandibular)/Introductory Paragraph: The rationale for Mohs was explained to the patient and consent was obtained. The risks, benefits and alternatives to therapy were discussed in detail. Specifically, the risks of damage to the marginal mandibular branch of the facial nerve, infection, scarring, bleeding, prolonged wound healing, incomplete removal, allergy to anesthesia, and recurrence were addressed. Prior to the procedure, the treatment site was clearly identified and confirmed by the patient. All components of Universal Protocol/PAUSE Rule completed. Posterior Auricular Interpolation Flap Text: Due to location on free margin and exposed cartilage and to restore structure and function, a decision was made to reconstruct the defect utilizing an interpolation axial flap and a staged reconstruction.  A telfa template was made of the defect.  This telfa template was then used to outline the posterior auricular interpolation flap.  The donor area for the pedicle flap was then injected with anesthesia.  The flap was excised through the skin and subcutaneous tissue down to the layer of the underlying musculature.  The pedicle flap was carefully excised within this deep plane to maintain its blood supply.  The edges of the donor site were undermined.   The donor site was closed in a primary fashion.  The pedicle was then rotated into position and sutured.  Once the tube was sutured into place, adequate blood supply was confirmed with blanching and refill.  The pedicle was then wrapped with xeroform gauze and dressed appropriately with a telfa and gauze bandage to ensure continued blood supply and protect the attached pedicle. Special Stains Stage 2 - Results: Base On Clearance Noted Above Retention Suture Text: Retention sutures were placed to support the closure and prevent dehiscence. Epidermal Sutures: 5-0 Fast Absorbing Gut Subsequent Stages Histo Method Verbiage: Using a similar technique to that described above, a thin layer of tissue was removed from all areas where tumor was visible on the previous stage.  The tissue was again oriented, mapped, dyed, and processed as above. Intermediate Repair And Flap Additional Text (Will Appearing After The Standard Complex Repair Text): The intermediate repair was not sufficient to completely close the primary defect. The remaining additional defect was repaired with the flap mentioned below. Oculoplastic Surgeon Procedure Text (E): After obtaining clear surgical margins the patient was sent to oculoplastics for surgical repair.  The patient understands they will receive post-surgical care and follow-up from the referring physician's office. Provider Procedure Text (A): After obtaining clear surgical margins the defect was repaired by another provider. Incidental Actinic Keratosis Histology Text: Incidental AK was noted at the periphery of the Mohs section destruction was performed, pt was was advised to monitor, and/or patient was advised to considered topical 5FU once fully healed. Ear Star Wedge Flap Text: Because of the tightness of the surrounding skin, the full-thickness nature of the defect with exposed cartilage, and to avoid deformity, a star wedge advancement flap was planed.  After prep and anesthesia, a full-thickness triangular wedge of tissue was excised, as well as two brows triangles on either side of this to reduce cupping deformity. The chondrocutaneous flaps were then advanced and closed in a layered fashion. Bcc  Morpheaform/Sclerosing Histology Text: There were aggregates of basaloid cells demonstrating a morpheaform/sclerosing pattern. Body Location Override (Optional - Billing Will Still Be Based On Selected Body Map Location If Applicable): left lateral cheek Mucosal Advancement Flap Text: Because of the full-thickness nature of the wound and in order to restore and maintain function, a mucosal advancement flap was planned. After prep and local anesthesia, Burow’s triangles were excised adjacent to the defect.  Two flaps were created by undermining in the deep subcutaneous plane over the orbicularis oris. After hemostasis, the flaps were advanced and closed in a layered fashion, with care to maintain vermillion border and oral commissures. Double O-Z Plasty Text: Because of the full-thickness nature of the defect and location adjacent to important structure(s), a bilateral rotation flap (double O to Z) was planned. After prep and anesthesia, arcuate incisions were extended from two opposite side of the wound.  Two flaps were created by undermining in the subcutaneous plane. After hemostasis was obtained, the flaps were advanced and sutured in a layered fashion. M-Plasty Complex Repair Preamble Text (Leave Blank If You Do Not Want): Extensive wide undermining was performed. Purse String (Simple) Text: In order to control the direction of wound closure, to prevent distortion from wound contraction, and to reduce wound size to facilitate wound care and healing, an intermediate repair was planned.  After prep and anesthesia, and circumferential undermining, subcutaneous and dermal stitches were carefully placed across the wound. Repair Anesthesia Method: local infiltration Double O-Z Flap Text: Because of the full-thickness nature of the defect and location adjacent to important structure(s), a bilateral rotation flap (O to T) was planned. After prep and anesthesia, arcuate incisions were extended from two opposite side of the wound.  Two flaps were created by undermining in the subcutaneous plane. After hemostasis was obtained, the flaps were advanced and sutured in a layered fashion. Hemigard Retention Suture: 2-0 Nylon Area Suspicious For Tumor Histology Text: An area suspicious for additional tumor was noted and excised with additional stage(s). Stage 11: Additional Anesthesia Type: 1% lidocaine with epinephrine Dfsp Histology Text: There were densely arranged spindle-shaped cells. Eyelid Full Thickness Repair - 49337: The eyelid defect was full thickness which required a wedge repair of the eyelid. Special care was taken to ensure that the eyelid margin was realligned when placing sutures. Consent Type: Consent 1 (Standard) Closure 2 Information: This tab is for additional flaps and grafts, including complex repair and grafts and complex repair and flaps. You can also specify a different location for the additional defect, if the location is the same you do not need to select a new one. We will insert the automated text for the repair you select below just as we do for solitary flaps and grafts. Please note that at this time if you select a location with a different insurance zone you will need to override the ICD10 and CPT if appropriate. Primary Defect Width In Cm (Final Defect Size - Required For Flaps/Grafts): 1.3 Intermediate Repair And Graft Additional Text (Will Appearing After The Standard Complex Repair Text): The intermediate repair was not sufficient to completely close the primary defect. The remaining additional defect was repaired with the graft mentioned below. Unique Flap 3 Name: Spear Flap Mixed Nodular And Micronodular Bcc Histology Text: There were aggregates of basaloid cells in a nodular and micro nodular pattern. Xenograft Text: Because of the size and depth of the defect and to facilitate healing by granulation, a tissue-cultured epidermal autograft was planned.  After prep and local anesthesia, Xenograft material was trimmed to fi the defect and secured Repair Type: Complex Repair Transposition Flap Text: Because of orientation and full-thickness nature of the defect, a rhombic transposition flap was planned. After prep and anesthesia, the rhombic flap was carefully incised to straddle relaxed skin tension lines and the anticipated Burow's triangle removed. The flap was raised and the wound edges were all undermined in the subcutaneous plane. After hemostasis, the flap was transposed into the defect and sutured in a layered fashion. Inflammation Suggestive Of Cancer Camouflage Histology Text: There was a dense lymphocytic infiltrate which prevented adequate histologic evaluation of adjacent structures. Interpolation Flap Text: In order to maintain the form and function of the airway, and to maintain the alar groove, a two-stage interpolation axial flap and staged reconstruction was planned for closure.  A telfa template was made of the defect.  This was then used to outline the cheek interpolation flap.  The donor area for the pedicle flap was then anesthetized.  The flap was incised through the skin and subcutaneous tissue down to the layer of the underlying musculature.  The flap was carefully incised within the deep plane to maintain its blood supply. The pedicle was then rotated into position and sutured.  \\n\\nTo close the donor-site defect on the cheek, an advancement flap was created by wide undermining laterally in the subcutaneous plane. After hemostasis was obtained, this flap was advanced medially and sutured in a layered fashion.  The portion of the advancement flap near the pedicle of the interpolation flap was closed with care, so as not to constrict the vasculature of the pedicle.   \\n\\nOnce the flaps were sutured into place, adequate blood supply was confirmed with blanching and refill.  The pedicle was wrapped with xeroform gauze and dressed with telfa and gauze bandage to ensure continued blood supply and protect the attached pedicle. Bcc Infundibulocystic Histology Text: There were aggregates of basaloid cells demonstrating an infundibulocystic pattern. Referred To Otolaryngology For Closure Text (Leave Blank If You Do Not Want): After obtaining clear surgical margins the patient was sent to otolaryngology for surgical repair.  The patient understands they will receive post-surgical care and follow-up from the repairing physician's office. Advancement-Rotation Flap Text: In order to maintain form and function of the airway, a spiral rotation flap was planned.  After prep and local anesthesia, an incision was made from the inferior, tangentially parallel to the alar rim, and then extended superiorly across the alar crease, vertically on the nasal sidewall, and laterally toward the cheek, or onto the cheek with a backcut.  The flap was undermined and after hemostasis was obtained, the flap was rotated down into place.  All wound edges were sutured in a layered fashion. Area H Indication Text: Tumors in this location are included in Area H (eyelids, eyebrows, nose, lips, chin, ear, pre-auricular, post-auricular, temple, genitalia, hands, feet, ankles and areola).  Tissue conservation is critical in these anatomic locations. Suturegard Intro: Intraoperative tissue expansion was performed, utilizing the SUTUREGARD device, in order to reduce wound tension. Scc Spindle Histology Text: There were aggregates of spindle-like squamous cells. Dermal Autograft Text: The defect edges were debeveled with a #15 scalpel blade.  Given the location of the defect, shape of the defect and the proximity to free margins a dermal autograft was deemed most appropriate.  Using a sterile surgical marker, the primary defect shape was transferred to the donor site. The area thus outlined was incised deep to adipose tissue with a #15 scalpel blade.  The harvested graft was then trimmed of adipose and epidermal tissue until only dermis was left.  The skin graft was then placed in the primary defect and oriented appropriately. Rhombic Flap Text: Because of the size and full-thickness nature of the defect, and in order to maintain form and function while avoiding distortion of the nearby nasal tip and ala, a rhombic transposition flap was planned. After prep and anesthesia, a Burow's triangle was excised laterally, just superior to the alar groove. The rhombic flap was carefully incised superior to the defect.  The flap was raised and the wound edges were all undermined in the subcutaneous plane. After hemostasis, the flap was transposed into the defect and sutured in a layered fashion. Retention Suture Bite Size: 3 mm Localized Dermabrasion With Sand Papertext: The patient was draped in routine manner.  Localized dermabrasion using sterile sand paper was performed in routine manner to papillary dermis. This spot dermabrasion is being performed to complete skin cancer reconstruction. It also will eliminate the other sun damaged precancerous cells that are known to be part of the regional effect of a lifetime's worth of sun exposure. This localized dermabrasion is therapeutic and should not be considered cosmetic in any regard. Detail Level: Detailed Complex Repair And Graft Additional Text (Will Appearing After The Standard Complex Repair Text): The complex repair was not sufficient to completely close the primary defect. The remaining additional defect was repaired with the graft mentioned below. Hemigard Intro: Due to skin fragility and wound tension, it was decided to use HEMIGARD adhesive retention suture devices to permit a linear closure. The skin was cleaned and dried for a 6cm distance away from the wound. Excessive hair, if present, was removed to allow for adhesion. Estlander Flap (Upper To Lower Lip) Text: The defect of the lower lip was assessed and measured.  Given the location and size of the defect, an Estlander flap was deemed most appropriate.  Using a sterile surgical marker, an appropriate Estlander flap was measured and drawn on the upper lip. Local anesthesia was then infiltrated. A scalpel was then used to incise the lateral aspect of the flap, through skin, muscle and mucosa, leaving the flap pedicled medially.  The flap was then rotated and positioned to fill the lower lip defect.  The flap was then sutured into place with a three layer technique, closing the orbicularis oris muscle layer with subcutaneous buried sutures, followed by a mucosal layer and an epidermal layer. Eye Protection Verbiage: Before proceeding with the stage, a plastic scleral shield was inserted. The globe was anesthetized with a few drops of 1% lidocaine with 1:100,000 epinephrine. Then, an appropriate sized scleral shield was chosen and coated with lacrilube ointment. The shield was gently inserted and left in place for the duration of each stage. After the stage was completed, the shield was gently removed. Consent (Lip)/Introductory Paragraph: The rationale for Mohs was explained to the patient and consent was obtained. The risks, benefits and alternatives to therapy were discussed in detail. Specifically, the risks of lip deformity, changes in the oral aperture, infection, scarring, bleeding, prolonged wound healing, incomplete removal, allergy to anesthesia, nerve injury and recurrence were addressed. Prior to the procedure, the treatment site was clearly identified and confirmed by the patient. All components of Universal Protocol/PAUSE Rule completed. Stage 1: Number Of Blocks?: 1 Scc Desmoplastic Subtype Histology Text: There were aggregates of squamous cells in a desk-plastic pattern. Alternatives Discussed Intro (Do Not Add Period): I discussed alternative treatments to Mohs surgery and specifically discussed the risks and benefits of Area L Indication Text: Tumors in this location are included in Area L (trunk and extremities).  Mohs surgery is indicated for larger tumors, or tumors with aggressive histologic features, in these anatomic locations. Mid-Level Procedure Text (F): After obtaining clear surgical margins the patient was sent to a mid-level provider for surgical repair.  The patient understands they will receive post-surgical care and follow-up from the mid-level provider. Hemigard Postcare Instructions: The HEMIGARD strips are to remain completely dry for at least 5-7 days. Paramedian Forehead Flap Text: Because of the size and full-thickness nature of the defect, and to maintain form and function of the nose, a forehead flap with bilateral forehead advancement flap closure of the donor site was planned.  After prep and anesthesia, excisional preparation of the recipient site was performed by outlining the cosmetic unit of the nasal tip.  Hemostasis was obtained.  A template was then made of the defect and transferred with the appropriate length to the upper forehead.  The forehead flap was incised and elevated based on the supratrochlear neurovascular bundle.  This was carefully dissected over the orbital rim to the nasion.  It was distally thinned and transferred to the nasal tip.  This was sutured in a layered fashion.  To close the donor site defect on the forehead, a large Burow’s triangle was excised superiorly and posteriorly into the scalp, and two large flaps were elevated by undermining the entire anterior scalp and forehead to the temples bilaterally, and over the supraorbital rim inferiorly.  After hemostasis was obtained, these flaps were advanced and closed in a layered fashion. Referred To Oculoplastics For Closure Text (Leave Blank If You Do Not Want): After obtaining clear surgical margins the patient was sent to oculoplastics for surgical repair.  The patient understands they will receive post-surgical care and follow-up from the repairing physician's office. Alar Island Pedicle Flap Text: The defect edges were debeveled with a #15 scalpel blade.  Given the location of the defect, shape of the defect and the proximity to the alar rim an island pedicle advancement flap was deemed most appropriate.  Using a sterile surgical marker, an appropriate advancement flap was drawn incorporating the defect, outlining the appropriate donor tissue and placing the expected incisions within the nasal ala running parallel to the alar rim. The area thus outlined was incised with a #15 scalpel blade.  The skin margins were undermined minimally to an appropriate distance in all directions around the primary defect and laterally outward around the island pedicle utilizing iris scissors.  There was minimal undermining beneath the pedicle flap. Mustarde Flap Text: Because of full-thickness of the defect and to avoid distortion of the lower eyelid and canthus, a Mustarde cheek rotation flap was planned. After prep and anesthesia, a large Burow’s triangle was excised inferiorly.  An incision was made from the superior portion of the wound over the orbital rim, curving upward onto the temple, then down toward the preauricular crease where another Burow’s triangle was excised.  The full cheek flap was elevated and the wound edges were undermined in the subcutaneous plane. After hemostasis, the flap was rotated into place with care to preserve lower lid position, trimmed at the tip, suspended with a periosteal stitch from the orbital rim, and sutured in a a layered fashion. Initial Size Of Lesion: 1.1 Unique Flap 2 Name: Free Cartilage graft O-T Plasty Text: Because of the full-thickness nature of the defect and location adjacent to important structure(s), a bilateral advancement flap (O to T) was planned. After prep and anesthesia, a Burow's triangle was excised adjacent to the defect.  Incisions were extended from the opposite side of the wound, and smaller Burow’s triangles at the end of each incision.  Two flaps were created by undermining in the subcutaneous plane. After hemostasis was obtained, the flaps were advanced and sutured in a layered fashion. Staging Info: By selecting yes to the question above you will include information on AJCC 8 tumor staging in your Mohs note. Information on tumor staging will be automatically added for SCCs on the head and neck. AJCC 8 includes tumor size, tumor depth, perineural involvement and bone invasion. Same Histology In Subsequent Stages Text: The pattern and morphology of the tumor is as described in the first stage. O-Z Plasty Text: Because of the full-thickness nature of the defect and location adjacent to important structure(s), a bilateral rotation flap (O to Z) was planned. After prep and anesthesia, arcuate incisions were extended from two opposite side of the wound.  Two flaps were created by undermining in the subcutaneous plane. After hemostasis was obtained, the flaps were advanced and sutured in a layered fashion. Home Suture Removal Text: Patient was provided instructions on removing sutures and will remove their sutures at home.  If they have any questions or difficulties they will call the office. Dressing (No Sutures): pressure dressing with telfa Bill 59 Modifier?: No - Continue to Bill 79 Modifier Mohs Method Verbiage: An incision following the standard Mohs approach was done and the specimen was harvested as a microscopic controlled layer. Double Z Plasty Text: The lesion was extirpated to the level of the fat with a #15 scalpel blade. Given the location of the defect, shape of the defect and the proximity to free margins a double Z-plasty was deemed most appropriate for repair. Using a sterile surgical marker, the appropriate transposition arms of the double Z-plasty were drawn incorporating the defect and placing the expected incisions within the relaxed skin tension lines where possible. The area thus outlined was incised deep to adipose tissue with a #15 scalpel blade. The skin margins were undermined to an appropriate distance in all directions utilizing iris scissors. The opposing transposition arms were then transposed and carried over into place in opposite direction and anchored with interrupted buried subcutaneous sutures. Unique Flap 1 Name: Nasal Advancement Flap Orbicularis Oris Muscle Flap Text: Due to the deep nature of the defect, location near free margin, and to restore oral sphincter function, an orbicularis oris muscle flap was planned.  Using a sterile surgical marker, an appropriate flap was drawn incorporating the defect. The area thus outlined was incised and advanced into place, re-establishing function and closing the wound, and secured with layered stitches. Epidermal Closure Graft Donor Site (Optional): running Nasal Turnover Hinge Flap Text: Due to the deep nature of the defect, and to restore structure and function, and to cover and ensure survival of underlying tissue, and to provide cutaneous coverage, a cutaneous hinge flap was performed.  Three sides of the full-thickness skin adjacent to the defect were incised and flipped over like a page of the book into the defect, and secured with Vicryl stitches. Desmoplastic Trichoepithelioma Histology Text: There were basaloid cell proliferation in an infiltrative sclerosing pattern. Abbe Flap (Upper To Lower Lip) Text: The defect of the lower lip was assessed and measured.  Given the location and size of the defect, an Abbe flap was deemed most appropriate.  Using a sterile surgical marker, an appropriate Abbe flap was measured and drawn on the upper lip. Local anesthesia was then infiltrated.  A scalpel was then used to incise the upper lip through and through the skin, vermilion, muscle and mucosa, leaving the flap pedicled on the opposite side.  The flap was then rotated and transferred to the lower lip defect.  The flap was then sutured into place with a three layer technique, closing the orbicularis oris muscle layer with subcutaneous buried sutures, followed by a mucosal layer and an epidermal layer. O-T Advancement Flap Text: Because of the full-thickness nature of the defect and location adjacent to important structure(s), a bilateral advancement flap (A to T) was planned. After prep and anesthesia, a Burow's triangle was excised adjacent to the defect.  Incisions were extended from the opposite side of the wound, and smaller Burow’s triangles at the end of each incision.  Two flaps were created by undermining in the subcutaneous plane. After hemostasis was obtained, the flaps were advanced and sutured in a layered fashion. Cheiloplasty (Complex) Text: In order to maintain form and function of the lip, and to avoid distortion of the free margin, a lip advancement flap was planned. After prep and local anesthesia, Burow’s triangles were excised superiorly to the nasal sill and inferiorly around the lip to the labial mucosa.  Two flaps were elevated by undermining the skin laterally in both directions,  the skin and mucosa from the orbicularis muscle.  Any redundant orbicularis oris muscle was removed and complimentary edges of remaining muscle reapposed with a horizontal mattress stitch.  After hemostasis was obtained, the flaps were advanced and closed in a layered fashion. Closure 3 Information: This tab is for additional flaps and grafts above and beyond our usual structured repairs.  Please note if you enter information here it will not currently bill and you will need to add the billing information manually. No Residual Tumor Seen Histology Text: There were no malignant cells seen in the sections examined. Deep Sutures: 5-0 Vicryl Ck5 - Negative Histology Text: CK staining demonstrates a normal density and pattern. Scc Ka Subtype Histology Text: There were aggregates of glassy squamous cells consistent with keratoacanthoma. Tissue Cultured Epidermal Autograft Text: Because of the size and depth of the defect and to facilitate healing by granulation, a tissue-cultured epidermal autograft was planned.  After prep and local anesthesia, Xenograft material was trimmed to fi the defect and secured circumferentially. Localized Dermabrasion With 15 Blade Text: The patient was draped in routine manner.  Localized dermabrasion using a 15 blade was performed in routine manner to papillary dermis. This spot dermabrasion is being performed to complete skin cancer reconstruction. It also will eliminate the other sun damaged precancerous cells that are known to be part of the regional effect of a lifetime's worth of sun exposure. This localized dermabrasion is therapeutic and should not be considered cosmetic in any regard. Debridement Text: The wound edges were debrided prior to proceeding with the closure to facilitate wound healing. Mart-1 - Positive Histology Text: MART-1 staining demonstrates areas of higher density and clustering of melanocytes with Pagetoid spread upwards within the epidermis. The surgical margins are positive for tumor cells. Bcc Keratotic Histology Text: There were aggregates of basaloid cells demonstrating a pink keratotic pattern. Additional Anesthesia Volume In Cc: 6 Bilobed Flap Text: Because of the size and full-thickness nature of the defect, and to maintain form and function of the nose, and to avoid distortion of the nearby nasal tip and ala, a bilobed transposition flap was planned. After prep and local anesthesia, the bilobe was carefully incised with a Burow's triangle oriented superiorly. The flap was raised and the wound edges were undermined in the submuscular plane to the nasofacial junction. After hemostasis, the flaps were transposed into the defect and sutured in a layered fashion Split-Thickness Skin Graft Text: Because of the size and thickness of the defect, and to provide coverage and facilitate healing with minimal contraction, a split-thickness skin graft was planned.  The donor site was marked and the graft harvested by scalpel, Weck blade, or dermatome.  The graft was then trimmed to fit the defect.  It was sutured into place and a bolster dressing applied. Rotation Flap Text: Because of the full-thickness nature of the defect and proximity to vital structures, a rotation flap was planned. After prep and local anesthesia, the flap was carefully incised along an arc.  A Burow's triangle was removed adjacent to the defect and along the outside of the arc. The flap was raised and the wound edges were undermined in the subcutaneous plane. After hemostasis, the flap was rotated into the defect. The distal tip of the flap was trimmed to fit the defect. The entirety of the flap's arcuate border was sutured in a layered fashion Ftsg Text: Because of the full-thickness nature of the defect, to protect underlying structures, and to avoid distortion, a full-thickness skin graft was planned. After prep and local anesthesia, a template was made of the defect and the graft was harvested.  The graft was defatted and trimmed to fit the defect. It was sutured into place circumferentially and a tie-over Bolster dressing was applied.  The donor site was converted to a fusiform defect, and after hemostasis, was closed in a layered fashion. W Plasty Text: The lesion was extirpated to the level of the fat with a #15 scalpel blade.  Given the location of the defect, shape of the defect and the proximity to free margins a W-plasty was deemed most appropriate for repair.  Using a sterile surgical marker, the appropriate transposition arms of the W-plasty were drawn incorporating the defect and placing the expected incisions within the relaxed skin tension lines where possible.    The area thus outlined was incised deep to adipose tissue with a #15 scalpel blade.  The skin margins were undermined to an appropriate distance in all directions utilizing iris scissors.  The opposing transposition arms were then transposed into place in opposite direction and anchored with interrupted buried subcutaneous sutures. Consent (Ear)/Introductory Paragraph: The rationale for Mohs was explained to the patient and consent was obtained. The risks, benefits and alternatives to therapy were discussed in detail. Specifically, the risks of ear deformity, infection, scarring, bleeding, prolonged wound healing, incomplete removal, allergy to anesthesia, nerve injury and recurrence were addressed. Prior to the procedure, the treatment site was clearly identified and confirmed by the patient. All components of Universal Protocol/PAUSE Rule completed. Double Island Pedicle Flap Text: The defect edges were debeveled with a #15 scalpel blade.  Given the location of the defect, shape of the defect and the proximity to free margins a double island pedicle advancement flap was deemed most appropriate.  Using a sterile surgical marker, an appropriate advancement flap was drawn incorporating the defect, outlining the appropriate donor tissue and placing the expected incisions within the relaxed skin tension lines where possible.    The area thus outlined was incised deep to adipose tissue with a #15 scalpel blade.  The skin margins were undermined to an appropriate distance in all directions around the primary defect and laterally outward around the island pedicle utilizing iris scissors.  There was minimal undermining beneath the pedicle flap. The wound edges were sutured in a layered fashion. Mixed Superficial And Nodular Bcc Histology Text: There were aggregates of basaloid cells in a superficial and nodular pattern. Otolaryngologist Procedure Text (D): After obtaining clear surgical margins the patient was sent to otolaryngology for surgical repair.  The patient understands they will receive post-surgical care and follow-up from the referring physician's office. Bcc Superficial Histology Text: There were aggregates of basaloid cells budding from the epidermis. No Repair - Repaired With Adjacent Surgical Defect Text (Leave Blank If You Do Not Want): After obtaining clear surgical margins the defect was repaired concurrently with another surgical defect which was in close approximation. Bcc Micronodular Histology Text: There were aggregates of basaloid cells demonstrating a micro nodular pattern. Length To Time In Minutes Device Was In Place: 10 Medical Necessity Statement: Based on my medical judgement, Mohs surgery is the most appropriate treatment for this cancer compared to other treatments. Post-Care Instructions: I reviewed with the patient in detail post-care instructions. Patient is not to engage in any heavy lifting, exercise, or swimming for the next 14 days. Should the patient develop any fevers, chills, bleeding, severe pain patient will contact the office immediately. Postop Diagnosis: same Pain Refusal Text: I offered to prescribe pain medication but the patient refused to take this medication. Estimated Blood Loss (Cc): minimal Undermining Type: Entire Wound Missing Epidermis Histology Text: Missing tissue was noted on frozen sections and additional slides were cut until present.  If no additional tissue containing epidermis was available, then an additional stage was excised. Cheiloplasty (Less Than 50%) Text: In order to maintain form and function of the lip, and to avoid distortion of the free margin, a lip advancement flap was planned. After rep and local anesthesia, Burow’s triangles were excised superiorly to the nasal sill and inferiorly around the lip to the labial mucosa.  Two flaps were elevated by undermining the skin laterally in both directions,  the skin and mucosa from the orbicularis muscle.  Any redundant orbicularis oris muscle was removed and complimentary edges of remaining muscle reapposed with a horizontal mattress stitch.  After hemostasis was obtained, the flaps were advanced and closed in a layered fashion. Mohs Rapid Report Verbiage: The area of clinically evident tumor was marked with skin marking ink and appropriately hatched.  The initial incision was made following the Mohs approach through the skin.  The specimen was taken to the lab, divided into the necessary number of pieces, chromacoded and processed according to the Mohs protocol.  This was repeated in successive stages until a tumor free defect was achieved. Nasalis Myocutaneous Flap Text: Using a #15 blade, an incision was made around the donor flap to the level of the nasalis muscle. Wide lateral undermining was then performed in both the subcutaneous plane above the nasalis muscle, and in a submuscular plane just above periosteum. This allowed the formation of a free nasalis muscle axial pedicle which was still attached to the actual cutaneous flap, increasing its mobility and vascular viability. Hemostasis was obtained with pinpoint electrocoagulation. The flap was mobilized into position and the pivotal anchor points positioned and stabilized with buried interrupted sutures. Subcutaneous and dermal tissues were closed in a multilayered fashion with sutures. Tissue redundancies were excised, and the epidermal edges were apposed without significant tension and sutured with sutures. Scc Well Differentiated Histology Text: There were aggregates of well-differentiated squamous cells. Dressing: steri-strips and pressure dressing Mohs Case Number: A Helical Rim Text: The closure involved the helical rim. Banner Transposition Flap Text: Because of orientation and full-thickness nature of the defect, a long rhombic transposition flap (banner flap) was planned. After prep and anesthesia, the rhombic flap was carefully incised to straddle relaxed skin tension lines and the anticipated Burow's triangle removed. The flap was raised and the wound edges were all undermined in the subcutaneous plane. After hemostasis, the flap was transposed into the defect and sutured in a layered fashion. Bilateral Rotation Flap Text: The defect edges were debeveled with a #15 scalpel blade. Given the location of the defect, shape of the defect and the proximity to free margins a bilateral rotation flap was deemed most appropriate. Using a sterile surgical marker, an appropriate rotation flap was drawn incorporating the defect and placing the expected incisions within the relaxed skin tension lines where possible. The area thus outlined was incised deep to adipose tissue with a #15 scalpel blade. The skin margins were undermined to an appropriate distance in all directions utilizing iris scissors. Following this, the designed flap was carried over into the primary defect and sutured into place. Bilateral Helical Rim Advancement Flap Text: Because of the tightness of the surrounding skin, the full-thickness nature of the defect with exposed cartilage, and to avoid deformity, a helical rim advancement flap was planed.  After prep and anesthesia, an incision was made in the anterior portion of the ear through the skin and the cartilage to the posterior perichondrium both superiorly and inferiorly within the scapha of the ear.  Inferiorly, this extended to the lobule where a Burow’s triangle was excised anteriorly.  The chondrocutaneous flaps were then  from the ear posteriorly at the level of the perichondrium to the postauricular sulcus.  A small rim of cartilage was also excised around the contour of the ear both superiorly and inferiorly, and after hemostasis obtained, the chondrocutaneous flaps were advanced and closed in a layered fashion Bilobed Transposition Flap Text: Because of the orientation and full-thickness nature of the defect, and in order to avoid distortion of the surrounding structures, a bilobed flap was planned.  After prep and local anesthesia, the flap was then outlined, incised and elevated.  The skin was widely undermined to allow for closure of the donor site defect.  After hemostasis obtained, the flaps were transposed into place and sutured in a layered fashion. Nasalis-Muscle-Based Myocutaneous Island Pedicle Flap Text: In order to avoid distortion of nearby structures, an island pedicle flap was planned.  After prep and local anesthesia, a triangular incision was made.  The flap was dissected to a muscular subcutaneous pedicle.  The skin surrounding the flap was undermined to allow for closure of the donor-site defect. After hemostasis obtained, the flap was advanced into place and sutured in a layered fashion. Burow's Graft Text: Because of the full-thickness nature of the wound and surrounding skin tension, a complex linear repair with Burow's graft was planned. After prep and anesthesia, one or two Burow’s triangles were excised adjacent to the defect and placed into sterile saline.  Two flaps were raised bilaterally by undermining widely in the subcutaneous plane. Hemostasis was obtained and the flaps were advanced into the defect with care to avoid distortion, and the wound edges were closed in a layered fashion, leaving a smaller defect.  A Burow’s triangle was defatted and trimmed to fit this remaining defect, and sutured into place circumferentially. Location Indication Override (Is Already Calculated Based On Selected Body Location): Area M Unique Flap 2 Text: Because of the full-thickness nature of the defect and to reduce risk for alar rim retraction, and after discussion of options and risk for alar retraction with this repair, a free cartilage graft was planned.  An incision through the anterior antihelix was made and the skin lifted in the periochondrial plane.  A square of cartilage sized to fit defect. Pinch Graft Text: The defect edges were debeveled with a #15 scalpel blade. Given the location of the defect, shape of the defect and the proximity to free margins a pinch graft was deemed most appropriate. Using a sterile surgical marker, the primary defect shape was transferred to the donor site. The area thus outlined was incised deep to adipose tissue with a #15 scalpel blade.  The harvested graft was then trimmed of adipose tissue until only dermis and epidermis was left. The skin margins of the secondary defect were undermined to an appropriate distance in all directions utilizing iris scissors.  The secondary defect was closed with interrupted buried subcutaneous sutures.  The skin edges were then re-apposed with running  sutures.  The skin graft was then placed in the primary defect and oriented appropriately. Wound Care (No Sutures): Petrolatum Hemostasis: Electrodesiccation Star Wedge Flap Text: Because of the tightness of the surrounding skin, the full-thickness nature of the defect with exposed cartilage, and to avoid deformity, a star wedge advancement flap was planned.  After prep and anesthesia, a full-thickness triangular wedge of tissue was excised, as well as two Burow's triangles on either side of this to reduce cupping deformity. The chondrocutaneous flaps were then advanced and closed in a layered fashion. Which Instrument Did You Use For Dermabrasion?: Wire Brush Area M Indication Text: Tumors in this location are included in Area M (cheek, forehead, scalp, neck, jawline and pretibial skin).  Mohs surgery is indicated for tumors in these anatomic locations. Bcc Adenoid Histology Text: There were aggregates of basaloid cells demonstrating an adenoid or cribiform pattern. Consent 2/Introductory Paragraph: Mohs surgery was explained to the patient and consent was obtained. The risks, benefits and alternatives to therapy were discussed in detail. Specifically, the risks of infection, scarring, bleeding, prolonged wound healing, incomplete removal, allergy to anesthesia, nerve injury and recurrence were addressed. Prior to the procedure, the treatment site was clearly identified and confirmed by the patient. All components of Universal Protocol/PAUSE Rule completed. Scc Acantholytic Histology Text: There were aggregates of squamous cells in an acantholytic pattern. Non-Graft Cartilage Fenestration Text: The exposed cartilage was fenestrated with a 3mm punch biopsy to help facilitate wound healing, and decrease infection and chondritis. Cartilage Graft Text: Because of the laxity of the alar rim resulting from loss of fibrofatty support, and to preserve form and function of the nose, a cartilage graft was planned.  An incision was made into the conchal bowl and a cutaneous flap was elevated. The conchal bowl cartilage was excised and after hemostasis was obtained, the cutaneous flap was replaced and sutured down.  The cartilage graft was then carved to fit the defect.  Two pockets were made medially and laterally in the alar rim, and the cartilage strut was sutured into place with Vicryl suture. Ear Wedge Repair Text: Due to exposed cartilage and to restore structure and function of the ear, a wedge excision was completed by carrying down an excision through the full thickness of the ear and cartilage with an inward facing Burow's triangle. The wound was then closed in a layered fashion. Tumor Depth: Less than 6mm from granular layer and no invasion beyond the subcutaneous fat Information: Selecting Yes will display possible errors in your note based on the variables you have selected. This validation is only offered as a suggestion for you. PLEASE NOTE THAT THE VALIDATION TEXT WILL BE REMOVED WHEN YOU FINALIZE YOUR NOTE. IF YOU WANT TO FAX A PRELIMINARY NOTE YOU WILL NEED TO TOGGLE THIS TO 'NO' IF YOU DO NOT WANT IT IN YOUR FAXED NOTE. Consent (Nose)/Introductory Paragraph: The rationale for Mohs was explained to the patient and consent was obtained. The risks, benefits and alternatives to therapy were discussed in detail. Specifically, the risks of nasal deformity, changes in the flow of air through the nose, infection, scarring, bleeding, prolonged wound healing, incomplete removal, allergy to anesthesia, nerve injury and recurrence were addressed. Prior to the procedure, the treatment site was clearly identified and confirmed by the patient. All components of Universal Protocol/PAUSE Rule completed. Scc In Situ Histology Text: There was squamous cell atypia and proliferation in a SCIS pattern. Tumor Debulked?: curette Skin Substitute Text: Because of the size and depth of the defect and to facilitate healing by granulation, a skin substitute graft was planned.  After prep and local anesthesia, Xenograft material was trimmed to fi the defect and secured circumferentially. Nostril Rim Text: The closure involved the nostril rim. Chonodrocutaneous Helical Advancement Flap Text: Because of the tightness of the surrounding skin, the full-thickness nature of the defect with exposed cartilage, and to avoid deformity, a helical rim advancement flap was planed.  After prep and anesthesia, an incision was made in the anterior portion of the ear through the skin and the cartilage to the posterior perichondrium both superiorly and inferiorly within the scapha of the ear.  Inferiorly, this extended to the lobule where a Burow’s triangle was excised anteriorly.  The chondrocutaneous flaps were then  from the ear posteriorly at the level of the perichondrium to the postauricular sulcus.  A small rim of cartilage was also excised around the contour of the ear and after hemostasis obtained, the chondrocutaneous flaps were advanced and closed in a layered fashion Metatypical Bcc Histology Text: There were aggregates of basaloid cells in a metatypical pattern. Spiral Flap Text: In order to maintain form and function of the airway, a spiral rotation flap was planned.  After prep and local anesthesia, an incision was made from the inferior wound edge, tangentially parallel to the alar rim, and then extended superiorly across the alar crease, vertically on the nasal sidewall, and laterally toward the cheek, or onto the cheek with a backcut.  The flap was undermined and after hemostasis was obtained, the flap was rotated down into place.  All wound edges were sutured in a layered fashion. Mart-1 - Negative Histology Text: MART-1 staining demonstrates a normal density and pattern of melanocytes along the dermal-epidermal junction. The surgical margins are negative for tumor cells. Crescentic Advancement Flap Text: In order to maintain form and function of the airway, a crescentic advancement flap was planned.  After prep and local anesthesia, a Burow’s triangle was excised superior to the defect.  A tangential and curvilinear incision was made onto the medial cheek.  Here, a crescentic Burow’s triangle was excised.  The laterally-based flap was then raised by dissection in the deep subcutaneous plane and the remainder of the wound edges were also undermined.  After hemostasis, the flap was advanced into the defect with a periosteal suture at the base of the flap and the lateral nasal bone.  All wound edges were closed in a layered fashion. Manual Repair Warning Statement: We plan on removing the manually selected variable below in favor of our much easier automatic structured text blocks found in the previous tab. We decided to do this to help make the flow better and give you the full power of structured data. Manual selection is never going to be ideal in our platform and I would encourage you to avoid using manual selection from this point on, especially since I will be sunsetting this feature. It is important that you do one of two things with the customized text below. First, you can save all of the text in a word file so you can have it for future reference. Second, transfer the text to the appropriate area in the Library tab. Lastly, if there is a flap or graft type which we do not have you need to let us know right away so I can add it in before the variable is hidden. No need to panic, we plan to give you roughly 6 months to make the change. Consent 1/Introductory Paragraph: The rationale for Mohs was explained to the patient and consent was obtained. The risks, benefits and alternatives to therapy were discussed in detail. Specifically, the risks of infection, scarring, bleeding, prolonged wound healing, incomplete removal, allergy to anesthesia, nerve injury and recurrence were addressed. Prior to the procedure, the treatment site was clearly identified and confirmed by the patient. All components of Universal Protocol/PAUSE Rule completed. Consent (Spinal Accessory)/Introductory Paragraph: The rationale for Mohs was explained to the patient and consent was obtained. The risks, benefits and alternatives to therapy were discussed in detail. Specifically, the risks of damage to the spinal accessory nerve, infection, scarring, bleeding, prolonged wound healing, incomplete removal, allergy to anesthesia, and recurrence were addressed. Prior to the procedure, the treatment site was clearly identified and confirmed by the patient. All components of Universal Protocol/PAUSE Rule completed. Trichoepithelioma Histology Text: There were aggregates of densely blue cells. Keystone Flap Text: Given the location of the defect, the surrounding tension, to facilitate healing, and the shape of the defect, a keystone flap was planned.  Using a sterile surgical marker, an appropriate keystone flap was drawn incorporating the defect, outlining the appropriate donor tissue and placing the expected incisions within the relaxed skin tension lines where possible. The area thus outlined was incised deep to adipose tissue with a #15 scalpel blade.  The skin margins were undermined to an appropriate distance in all directions around the primary defect and laterally outward around the flap utilizing iris scissors.  The wound edges were sutured in a layered fashion. Localized Dermabrasion With Wire Brush Text: First, a scalpel was used to shave remove protuberant scar and sebaceous hyperplasia.  Next, sterilized sandpaper was used to physically abrade to papillary dermis. This spot dermabrasion was performed to complete skin cancer reconstruction. It also will eliminate the other sun damaged precancerous cells that are known to be part of the regional effect of a lifetime's worth of sun exposure. This localized dermabrasion is therapeutic and should not be considered cosmetic in any regard. Epidermal Autograft Text: Because of the location and depth of the defect and to facilitate healing, an epidermal autograft was deemed most appropriate.  The epidermal-dermal pinch grafts were then harvested.  The skin grafts were then placed in the primary defect and oriented appropriately. The grafts were secured with vaseline gauze and bandage. Melolabial Transposition Flap Text: In order to maintain form and function of the airway, and to avoid distortion, a nasolabial transposition flap with cheek advancement flap closure at the donor site was planned. After prep and local anesthesia, a Burow's triangle was excised superiorly toward the inner canthus.  An incision was made inferiorly in the nasolabial fold to the lateral commissure of the mouth, then extended superiorly on the medial cheek where a nasolabial flap was elevated by undermining the skin in the subcutaneous plane of the cheek.  The primary defect on the nose was also undermined. The flap was distally thinned, transposed into place, amputated at the tip to fit the defect, and sutured to the nose defect in a layered fashion.  See above for size of this flap.  \\n\\nTo close the donor-site defect on the cheek, a cheek advancement flap was elevated by undermining the skin of the cheek in the subcutaneous plane laterally beyond the lateral canthus and superiorly above the orbital rim. After hemostasis was obtained, the flap was advanced medially and attached with peristeal stitches to the pyriform aperture of the maxilla and to the ascending portion of the maxilla. Remaining wound edges were closed in a layered fashion.  This cheek advancement flap measured 3 x 4 cm. Consent 3/Introductory Paragraph: I gave the patient a chance to ask questions they had about the procedure.  Following this I explained the Mohs procedure and consent was obtained. The risks, benefits and alternatives to therapy were discussed in detail. Specifically, the risks of infection, scarring, bleeding, prolonged wound healing, incomplete removal, allergy to anesthesia, nerve injury and recurrence were addressed. Prior to the procedure, the treatment site was clearly identified and confirmed by the patient. All components of Universal Protocol/PAUSE Rule completed. Vermilion Border Text: The closure involved the vermilion border. Scc Moderately Differentiated Histology Text: There were aggregates of moderately-differentiated squamous cells. Depth Of Tumor Invasion (For Histology): tumor not visualized Muscle Hinge Flap Text: Due to the deep nature of the defect, and to ensure survival of the overlying flap or graft repair for cutaneous coverage, the wound was first addressed with a muscle hinge flap.  Three sides of the muscle were incised and flipped over like a page of the book into the defect, and secured with Vicryl stitches. Complex Repair And Flap Additional Text (Will Appearing After The Standard Complex Repair Text): The complex repair was not sufficient to completely close the primary defect. The remaining additional defect was repaired with the flap mentioned below. Surgeon/Pathologist Verbiage (Will Incorporate Name Of Surgeon From Intro If Not Blank): operated in two distinct and integrated capacities as the surgeon and pathologist. Fibroepithelioma Of Pinkus Histology Text: There were aggregates of basaloid cells consistent with fibroepithelioma of pinks. Incidental Squamous Cell Carcinoma In Situ Histology Text: Incidental SCIS was noted at the periphery of the Mohs section and additional stages or destruction were performed until clear. Which Eyelid Repair Cpt Are You Using?: 60193 Scc Poorly Differentiated Histology Text: There were aggregates of poorly-differentiated squamous cells. O-L Flap Text: Because of the full-thickness nature of the defect, and to preserve nearby structures and landmarks, a Burow’s advancement flap (L-plasty) was planned. After prep and anesthesia, a Burow's triangle was excised adjacent to the defect.  Perpendicular to this, a line was incised and crescentic Burow’s triangle excised.  The flap was elevated by undermining in the subcutaneous plane. Hemostasis was obtained.  The flap was advanced into the defect with care to avoid distortion and was sutured into place in a layered fashion Bcc Infiltrative Histology Text: There were aggregates of basaloid cells demonstrating an infiltrative pattern. Tarsorrhaphy Text: A tarsorrhaphy was performed using Frost sutures. Trilobed Flap Text: Because of the size and full-thickness nature of the defect, and to maintain form and function of the nose, and to avoid distortion of the nearby nasal tip and ala, a trilobed transposition flap was planned. After prep and local anesthesia, the trilobe was carefully incised with a Burow's triangle oriented superiorly. The flap was raised and the wound edges were undermined in the submuscular plane to the nasofacial junction. After hemostasis, the flaps were transposed into the defect and sutured in a layered fashion Helical Rim Advancement Flap Text: Because of the tightness of the surrounding skin, the full-thickness nature of the defect with exposed cartilage, and to avoid deformity, a helical rim advancement flap was planned.  After prep and anesthesia, any protuberant cartilage or cartilage in the way of tissue movement and approximation was excised.  Then, an incision was made in the anterior portion of the ear through the skin to the posterior ear at the level of the perichondrium both superiorly and inferiorly.  Inferiorly, this extended to the lobule where a Burow’s triangle was excised anteriorly.  The flaps were then  from the ear posteriorly at the level of the perichondrium to the postauricular sulcus.  After hemostasis obtained, the flaps were advanced and closed in a layered fashion Unique Flap 3 Text: Because of the through-and-through defect of the lateral ala, in order to restore the nose and maintain an open airway, a island-pedicle Spear flap was planned with cheek advancement flap.  After prep and anesthesia, a template was made of the right side of the lip and transferred to the left side to outline the normal anatomic position of the triangular portion of the upper lip.  A template was then made of the lining and outer defect of the left ala and transferred to the medial cheek adjacent to the nasolabial fold.  An island-pedicle flap was incised and elevated and carefully dissected to a muscular subcutaneous pedicle based near the piriform aperture and ascending portion of the maxilla.  This was carefully turned over and sutured to line the ala, then turned up on itself where it was sutured in a layered fashion.  \\n\\nTo close the donor site defect, a cheek flap was elevated by undermining in the subcutaneous plane lateral to the lateral canthus  After hemostasis was obtained the flap was advanced medially, attached to the piriform aperture of the axilla and ascending portion of the maxilla, and then closed in a layered fashion.  The advancement flap measured 3 x 4 cm. Mixed Nodular And Infiltrative Bcc Histology Text: There were aggregates of basaloid cells in a nodular and infiltrative pattern. Rectangular Flap Text: The defect edges were debeveled with a #15 scalpel blade. Given the location of the defect and the proximity to free margins a rectangular flap was deemed most appropriate. Using a sterile surgical marker, an appropriate rectangular flap was drawn incorporating the defect. The area thus outlined was incised deep to adipose tissue with a #15 scalpel blade. The skin margins were undermined to an appropriate distance in all directions utilizing iris scissors. Following this, the designed flap was carried over into the primary defect and sutured into place. Brow Lift Text: A midfrontal incision was made medially to the defect to allow access to the tissues just superior to the left eyebrow. Following careful dissection inferiorly in a supraperiosteal plane to the level of the left eyebrow, several 3-0 monocryl sutures were used to resuspend the eyebrow orbicularis oculi muscular unit to the superior frontal bone periosteum. This resulted in an appropriate reapproximation of static eyebrow symmetry and correction of the left brow ptosis. Consent (Temporal Branch)/Introductory Paragraph: The rationale for Mohs was explained to the patient and consent was obtained. The risks, benefits and alternatives to therapy were discussed in detail. Specifically, the risks of damage to the temporal branch of the facial nerve, infection, scarring, bleeding, prolonged wound healing, incomplete removal, allergy to anesthesia, and recurrence were addressed. Prior to the procedure, the treatment site was clearly identified and confirmed by the patient. All components of Universal Protocol/PAUSE Rule completed. Suturegard Body: The suture ends were repeatedly re-tightened and re-clamped to achieve the desired tissue expansion. Peng Advancement Flap Text: Because of the full-thickness nature of the defect and location adjacent to free margin of alar rims, and to maintain functional airway and alar rim position, a bilateral advancement flap was planned. After prep and anesthesia, incisions were extended from the opposite side of the wound along the alar grooves, and Burow’s triangles at the end of each incision were excised.  Two flaps were created by undermining in the subcutaneous plane skeletonizing the nasal cartilages. After hemostasis was obtained, the flaps were advanced and sutured in a layered fashion. H Plasty Text: Given the location of the defect, shape of the defect and the proximity to free margins a H-plasty was deemed most appropriate for repair.  Using a sterile surgical marker, the appropriate advancement arms of the H-plasty were drawn incorporating the defect and placing the expected incisions within the relaxed skin tension lines where possible. The area thus outlined was incised deep to adipose tissue with a #15 scalpel blade. The skin margins were undermined to an appropriate distance in all directions utilizing iris scissors.  The opposing advancement arms were then advanced into place in opposite direction and anchored with interrupted buried subcutaneous sutures. Scc In Situ With Follicular Extension Histology Text: There was squamous cell atypia and proliferation in a SCIS pattern, with follicular or adnexal extension. Consent (Near Eyelid Margin)/Introductory Paragraph: The rationale for Mohs was explained to the patient and consent was obtained. The risks, benefits and alternatives to therapy were discussed in detail. Specifically, the risks of ectropion or eyelid deformity, infection, scarring, bleeding, prolonged wound healing, incomplete removal, allergy to anesthesia, nerve injury and recurrence were addressed. Prior to the procedure, the treatment site was clearly identified and confirmed by the patient. All components of Universal Protocol/PAUSE Rule completed. Mastoid Interpolation Flap Text: Due to the full-thickness nature of the wound and to restore structure/function, a decision was made to reconstruct the defect utilizing an interpolation axial flap and a staged reconstruction.  A telfa template was made of the defect.  This telfa template was then used to outline the mastoid interpolation flap.  The donor area for the pedicle flap was then injected with anesthesia.  The flap was excised through the skin and subcutaneous tissue down to the layer of the underlying musculature.  The pedicle flap was carefully excised within this deep plane to maintain its blood supply.  The edges of the donor site were undermined.   The donor site was closed in a primary fashion.  The pedicle was then rotated into position and sutured.  Once the tube was sutured into place, adequate blood supply was confirmed with blanching and refill.  The pedicle was then wrapped with xeroform gauze and dressed appropriately with a telfa and gauze bandage to ensure continued blood supply and protect the attached pedicle. Mauc Instructions: By selecting yes to the question below the MAUC number will be added into the note.  This will be calculated automatically based on the diagnosis chosen, the size entered, the body zone selected (H,M,L) and the specific indications you chose. You will also have the option to override the Mohs AUC if you disagree with the automatically calculated number and this option is found in the Case Summary tab. Referred To Plastics For Closure Text (Leave Blank If You Do Not Want): After obtaining clear surgical margins the patient was sent to plastics for surgical repair.  The patient understands they will receive post-surgical care and follow-up from the repairing physician's office. Incidental Superficial Basal Cell Carcinoma Histology Text: Incidental superficial BCC was noted at the periphery of the Mohs section and additional stages were performed until clear. Secondary Intention Text (Leave Blank If You Do Not Want): Due to the superficial nature of the defect and/or patient preference, the wound was allowed to heal by secondary intention. Consent (Scalp)/Introductory Paragraph: The rationale for Mohs was explained to the patient and consent was obtained. The risks, benefits and alternatives to therapy were discussed in detail. Specifically, the risks of changes in hair growth pattern secondary to repair, infection, scarring, bleeding, prolonged wound healing, incomplete removal, allergy to anesthesia, nerve injury and recurrence were addressed. Prior to the procedure, the treatment site was clearly identified and confirmed by the patient. All components of Universal Protocol/PAUSE Rule completed.

## 2024-10-31 NOTE — ED PROVIDER NOTE - PROGRESS NOTE DETAILS
Reassessment patient seen with significant nystagmus and right-sided droop, code stroke called.  Upon assessment patient appears to have right-sided hemianopsia.  CT with possible PCA occlusion.  Patient consented to be transferred to [facility] for further treatment, pending official Neuro recs. will continue neurochecks Q1. Plan discussed and reviewed with patient using  ID # [id number] Neuro updated notes in which neurointerventional radiologist states no role for emergent intervention given the location of occlusion is too high risk.  Patient to be admitted for neurochecks, and MRIs and echo.  Plan discussed and reviewed with patient. Pt with 18 beats Vtach- zoll placed magnesium written for in light of slightly low K pt asymptomatic- repeat ekg in progress

## 2024-10-31 NOTE — PATIENT PROFILE ADULT - NSFALLSECTIONLABEL_GEN_A_CORE
. Albendazole Counseling:  I discussed with the patient the risks of albendazole including but not limited to cytopenia, kidney damage, nausea/vomiting and severe allergy.  The patient understands that this medication is being used in an off-label manner.

## 2024-10-31 NOTE — CONSULT NOTE ADULT - SUBJECTIVE AND OBJECTIVE BOX
~~~~~~~~~~~~~~~~~~~~~~~~~~~~~~~~~~~~~~~~~~~~~~~~~~  Neurology Service   [facility] General Neurology Consult Service [id number], [phone]  [facility] Stroke Consult Service , Spectra-[phone] (7a-7p, otherwise call [phone])  ~~~~~~~~~~~~~~~~~~~~~~~~~~~~~~~~~~~~~~~~~~~~~~~~~~  History:  Niece (PCA here) assisting in Monegasque crole translation   HPI:   YINA PALACIOS is a 53year old Man with PMHx HTN, prior stroke ([place], perhaps residual gait abnormality but not clear symptoms) unclear if on antithrombotics who presents for dizziness not feeling well. LKW 1800 10/30/24, had african food then didnt feel well, vaguely described. Evaluated in ED, unsteady, marked left beating nystagmus, prompting code stroke.     LKN:   NIHSS: 2-  Complete hemianopia   preMRS:  0    Pt is not a candidate for tenecteplase due to outside tenecteplase window   Pt is not a candidate for mechanical thrombectomy due to no large vessel occlusion on CTA PENDING CTA     Review of Systems:    INCOMPLETE    CONSTITUTIONAL: No fevers or chills  EYES AND ENT: No visual changes or no throat pain   NECK: No pain or stiffness  RESPIRATORY: No hemoptysis or shortness of breath  CARDIOVASCULAR: No chest pain or palpitations  GASTROINTESTINAL: No melena or hematochezia  GENITOURINARY: No dysuria or hematuria  NEUROLOGICAL: +As stated in HPI above  SKIN: No itching, burning, rashes, or lesions   All other review of systems is negative unless indicated above.    PMHx:  Stroke  HTN (hypertension)    Social History:  Lives with wife and siblings  Alcohol use: no  Tobacco use:  no  Other drug use: no  not working, looking   ADLs: Independent     Medications  Home Medications:    MEDICATIONS  (STANDING):  meclizine 25 milliGRAM(s) Oral Once    MEDICATIONS  (PRN):      Exam:  Vitals:  T(C): 36.4 (10-31-24 @ 04:01), Max: 37.1 (10-31-24 @ 00:30)  T(F): 97.5 (10-31-24 @ 04:01), Max: 98.8 (10-31-24 @ 00:30)  HR: 70 (10-31-24 @ 04:01) (65 - 70)  BP: 167/103 (10-31-24 @ 04:01) (141/89 - 167/103)  RR: 15 (10-31-24 @ 04:01) (15 - 15)  SpO2: 99% (10-31-24 @ 04:01) (96% - 99%)  I&O's Summary    Physical and Neurological Examination:   - General: nad supine in stretcher    - Mental Status:   level of consciousness: Awake, alert  Orientation: Oriented to person, age, place, and time  Language: Speech is fluent with intact naming, repetition, and ability to follow commands (including simple commands and complex cross commands)  Cortical Signs: No extinction to tactile DSS, no hemineglect.     - Cranial Nerves:   PERRL, Right hemianopia appeared dense on first exam, partial on subseuqent exam, EOMI, facial sensation is intact in the V1-V3 distribution bilaterally; face is symmetric with cecilia smile; hearing is intact to finger rub bilaterally and equally; uvula is midline and soft palate rises symmetrically; shoulder shrug intact; tongue protrudes in the midline with intact lateral movements  He has marked left beating/left rotary nystagmus which improves on right gaze, worsens on left gaze, present on primary gaze  Nystagmus is so pronounced as to     - Motor Testing:  Bulk: Normal   Tone: Normal  Strength:  There is no pronator drift b/l  There is no leg drift b/l  NO orbiting                               Right           Left  Should-Abduct      5                   5  Elbow-Flex             5                   5  Elbow-Ext              5                   5                        5                   5    Hip-Flex                 5                   5  Knee-Flex              5                   5  Knee-Ext                5                   5  - Reflexes:              Right           Left  - Sensory: Intact throughout to light touch.   - Coordination: Finger-nose-finger intact without dysmetria.  Heel shin without dystmetria  - Gait: Could stand but very unbalanced, further exam deferred due to fall risk    Objective Studies  Labs:                          13.7   10.54 )-----------( 216      ( 31 Oct 2024 02:48 )             42.3       10-31    139  |  100  |  14  ----------------------------<  121[H]  3.4[L]   |  26  |  1.28    Ca    9.6      31 Oct 2024 02:48    TPro  8.1  /  Alb  4.5  /  TBili  0.4  /  DBili  x   /  AST  23  /  ALT  18  /  AlkPhos  91  10-31      POCT Blood Glucose.: 120 mg/dL (31 Oct 2024 04:26)              CSF:                CNS Imaging:         ~~~~~~~~~~~~~~~~~~~~~~~~~~~~~~~~~~~~~~~~~~~~~~~~~~  Neurology Service   [facility] General Neurology Consult Service [id number], [phone]  [facility] Stroke Consult Service , Spectra-[phone] (7a-7p, otherwise call [phone])  ~~~~~~~~~~~~~~~~~~~~~~~~~~~~~~~~~~~~~~~~~~~~~~~~~~  History:  Niece (PCA here) assisting in [place] creole translation   HPI:   YINA PALACIOS is a 53year old right handed Man with PMHx HTN, prior stroke ([place], perhaps residual gait abnormality but not clear symptoms) on aspirin and unclear other medicines who presents for dizziness not feeling well. LKW 1800 10/30/24, had african food then didnt feel well, vaguely described. Evaluated in ED, unsteady, marked left beating nystagmus, prompting code stroke.     LKN:   NIHSS: 2-  Complete hemianopia  + feeling unwell (presumed vertigo though poor historian)  preMRS:  0  Pt is not a candidate for tenecteplase due to outside tenecteplase window   Pt not a thrombectomy candidate, discussed with neurointerventional team, favor high grade stenosis over occlusion. Consider intervention in this vessel to have high risk.     Review of Systems:    other ROS negative     PMHx:  Stroke  HTN (hypertension)    Social History:  Lives with wife and siblings  Alcohol use: no  Tobacco use:  no  Other drug use: no  not working, looking   ADLs: Independent     Medications  Home Medications:  aspirin  amlodipine   several others - unknown     MEDICATIONS  (STANDING):  meclizine 25 milliGRAM(s) Oral Once    MEDICATIONS  (PRN):      Exam:  Vitals:  T(C): 36.4 (10-31-24 @ 04:01), Max: 37.1 (10-31-24 @ 00:30)  T(F): 97.5 (10-31-24 @ 04:01), Max: 98.8 (10-31-24 @ 00:30)  HR: 70 (10-31-24 @ 04:01) (65 - 70)  BP: 167/103 (10-31-24 @ 04:01) (141/89 - 167/103)  RR: 15 (10-31-24 @ 04:01) (15 - 15)  SpO2: 99% (10-31-24 @ 04:01) (96% - 99%)  I&O's Summary    Physical and Neurological Examination:   - General: nad supine in stretcher    - Mental Status:   level of consciousness: Awake, alert  Orientation: Oriented to person, age, place, and time  Language: Speech is fluent with intact naming, repetition, and ability to follow commands (including simple commands and complex cross commands)  Cortical Signs: No extinction to tactile DSS, no hemineglect.     - Cranial Nerves:   PERRL, Right hemianopia appeared dense on first exam, partial on subseuqent exam, EOMI, facial sensation is intact in the V1-V3 distribution bilaterally; face is symmetric with cecilia smile; hearing is intact to finger rub bilaterally and equally; uvula is midline and soft palate rises symmetrically; shoulder shrug intact; tongue protrudes in the midline with intact lateral movements  He has marked left beating/left rotary nystagmus which improves on right gaze, worsens on left gaze, present on primary gaze  Nystagmus is so pronounced as to     - Motor Testing:  Bulk: Normal   Tone: Normal  Strength:  There is no pronator drift b/l  There is no leg drift b/l  NO orbiting                               Right           Left  Should-Abduct      5                   5  Elbow-Flex             5                   5  Elbow-Ext              5                   5                        5                   5    Hip-Flex                 5                   5  Knee-Flex              5                   5  Knee-Ext                5                   5  - Reflexes:              Right           Left  - Sensory: Intact throughout to light touch.   - Coordination: Finger-nose-finger intact without dysmetria.  Heel shin without dystmetria  - Gait: Could stand but very unbalanced, further exam deferred due to fall risk    Objective Studies  Labs:                          13.7   10.54 )-----------( 216      ( 31 Oct 2024 02:48 )             42.3       10-31    139  |  100  |  14  ----------------------------<  121[H]  3.4[L]   |  26  |  1.28    Ca    9.6      31 Oct 2024 02:48    TPro  8.1  /  Alb  4.5  /  TBili  0.4  /  DBili  x   /  AST  23  /  ALT  18  /  AlkPhos  91  10-31      POCT Blood Glucose.: 120 mg/dL (31 Oct 2024 04:26)              CSF:                CNS Imaging:      ACC: [id number] EXAM:  CT BRAIN STROKE PROTOCOL   ORDERED BY: CHEN OLMEDO     PROCEDURE DATE:  10/31/2024          INTERPRETATION:  CLINICAL INFORMATION: Stroke Code, dizziness    COMPARISON: None.    CONTRAST:  IV Contrast: None  Complications: None    TECHNIQUE:  Serial axial images were obtained from the skull base to the   vertex using multi-slice helical technique. Sagittal and coronal   reformats were obtained.    FINDINGS:    VENTRICLES AND SULCI: Age appropriate involutional changes.  INTRA-AXIAL: No mass effect, acute hemorrhage, or midline shift.  There   are periventricular and subcortical white matter hypodensities,   consistent with microvascular type changes. Left occipital lobe   encephalomalacia.  EXTRA-AXIAL: No mass or fluid collection. Basal cisterns are normal in   appearance.    VISUALIZED SINUSES:  Clear.  TYMPANOMASTOID CAVITIES:  Clear.  VISUALIZED ORBITS: Normal.  CALVARIUM: Intact.    MISCELLANEOUS: None.      IMPRESSION:  No acute intracranial hemorrhage, mass effect, or midline shift. If   persistent concern for acute stroke, MRI could further evaluate in the   absence of contraindication.      Findings were discussed with Dr. Etienne 10/31/2024 4:41 AM by Dr. Morris   with read back confirmation.    --- End of Report ---          JOSÉ MORRIS DO; Resident Radiologist  This document has been electronically signed.  OZZY MARTINS MD; Attending Radiologist  This document has been electronically signed. Oct 31 2024  4:46AM    ACC: [id number] EXAM:  CT ANGIO NECK STROKE PROTCL IC   ORDERED BY:   CHEN OLMEDO     ACC: [id number] EXAM:  CT BRAIN PERFUSION MAPS STROKE   ORDERED BY:   CHEN OLMEDO     ACC: [id number] EXAM:  CT ANGIO BRAIN STROKE PROTC IC   ORDERED BY:   CHEN OLMEDO     PROCEDURE DATE:  10/31/2024          INTERPRETATION:  CLINICAL INFORMATION: Stroke Code, dizziness    COMPARISON: None.    CONTRAST:  IV Contrast: Omnipaque 350  50 cc administered  0 cc discarded  Complications: None reported attime of study completion    TECHNIQUE: CT perfusion and CTA of the head and neck were performed   following the intravenous administration of IV contrast. MIP   reconstructions were performed on a separate workstation and reviewed.   RAPID software was utilized for perfusion analysis.    FINDINGS:    CT PERFUSION:    TECHNICAL LIMITATIONS: None.    CBF<30%/CORE INFARCTION: 0 mL  TMAX>6s/UNDERPERFUSED TISSUE: 35 mL in the left occipital lobe/PCA   territory  MISMATCH VOLUME/TISSUE AT RISK: 35 mL in the left occipital lobe/PCA   territory  MISMATCH RATIO: Infinite.    MISCELLANEOUS: None.      CTA NECK:    AORTIC ARCH AND VISUALIZED GREAT VESSELS: Within normal limits.    RIGHT:  COMMON CAROTID ARTERY: No significant stenosis to the carotid bifurcation.  INTERNAL CAROTID ARTERY: Mild atherosclerosis No significant stenosis   based on NASCET criteria.  VERTEBRAL ARTERY: Dominant. Normal in course to the intracranial   circulation.    LEFT:  COMMON CAROTID ARTERY: No significant stenosis to the carotid bifurcation.  INTERNAL CAROTID ARTERY: Mild atherosclerosis. No significant stenosis   based on NASCET criteria.  VERTEBRAL ARTERY: Hypoplastic. Normal in course to the intracranial   circulation.    VISUALIZED LUNGS: Clear.    MISCELLANEOUS: Degenerative changes of the cervical spine.    CAROTID STENOSIS REFERENCE: Percent (%) stenosis is expressed in terms of   NASCET Criteria. (NASCET = 100x1-(N/D)). N=greatest narrowing. D=normal   distal diameter - MILD = <50% stenosis. - MODERATE = 50-69% stenosis. -   SEVERE = 70-89% stenosis. - HAIRLINE/CRITICAL = 90-99% stenosis. -   OCCLUDED = 100% stenosis.      CTA HEAD:    INTERNAL CAROTID ARTERIES: Atherosclerosis in the bilateral carotid   siphons. Suspected moderate stenosis in the supraclinoid segment of the   left ICA.    [place]: No aneurysm identified. Tiny aneurysms can be beyond   the resolution of CTA technique.    ANTERIOR CEREBRAL ARTERIES: No significant stenosis or occlusion.  MIDDLE CEREBRAL ARTERIES: No significant stenosis or occlusion.  POSTERIOR CEREBRAL ARTERIES: Proximal left PCA appears at least partially   occluded with the remainder of the left PCA severely diminutive in   caliber. Right PCA unremarkable.    DISTAL VERTEBRAL / BASILAR ARTERIES: Left vertebral artery hypoplastic   and terminates as the left PICA. Right vertebral artery and basilar   artery unremarkable.    VENOUS STRUCTURES: Inadequate venous phase opacification.    MISCELLANEOUS: No other vascular abnormality is seen.      IMPRESSION:    CT PERFUSION:  Technical limitations: None.    Core infarction: 0 ml  Penumbra / tissue at risk for active ischemia: 35 ml in the left   occipital lobe/PCA territory    CTA NECK:  No evidence of significant stenosis or occlusion.    CTA HEAD:  Proximal left PCA appears at least partially occluded with the remainder   of the left PCA severely diminutive in caliber.    Suspected moderate stenosis in the supraclinoid segment of the left ICA.    Remainder of the [place] grossly patent.    Findings discussed with Dr. Etienne by Dr. Morris at 5:05 AM with read back   confirmation.    --- End of Report ---          JOSÉ MORRIS DO; Resident Radiologist  This document has been electronically signed.  OZZY MARTINS MD; Attending Radiologist  This document has been electronically signed. Oct 31 2024  5:18AM             ~~~~~~~~~~~~~~~~~~~~~~~~~~~~~~~~~~~~~~~~~~~~~~~~~~  Neurology Service   [facility] General Neurology Consult Service [id number], [phone]  [facility] Stroke Consult Service , Spectra-[phone] (7a-7p, otherwise call [phone])  ~~~~~~~~~~~~~~~~~~~~~~~~~~~~~~~~~~~~~~~~~~~~~~~~~~  History:  Niece (PCA here) assisting in [place] creole translation   Patient is a vague historian   HPI:   YINA PALACIOS is a 53year old right handed Man with PMHx HTN, prior stroke ([place], perhaps residual gait abnormality but not clear symptoms) on aspirin and unclear other medicines who presents for dizziness not feeling well. LKW 1800 10/30/24, had african food then didnt feel well, vaguely described. Evaluated in ED, unsteady, marked left beating nystagmus, prompting code stroke.     LKN:   NIHSS: 2-  Complete hemianopia  + feeling unwell (presumed vertigo though poor historian)  preMRS:  0  Pt is not a candidate for tenecteplase due to outside tenecteplase window   Pt not a thrombectomy candidate, discussed with neurointerventional team, favor high grade stenosis over occlusion. Consider intervention in this vessel to have high risk.     Review of Systems:    other ROS negative     PMHx:  Stroke  HTN (hypertension)    Social History:  Lives with wife and siblings  Alcohol use: no  Tobacco use:  no  Other drug use: no  not working, looking   ADLs: Independent     Medications  Home Medications:  aspirin  amlodipine   several others - unknown     MEDICATIONS  (STANDING):  meclizine 25 milliGRAM(s) Oral Once    MEDICATIONS  (PRN):      Exam:  Vitals:  T(C): 36.4 (10-31-24 @ 04:01), Max: 37.1 (10-31-24 @ 00:30)  T(F): 97.5 (10-31-24 @ 04:01), Max: 98.8 (10-31-24 @ 00:30)  HR: 70 (10-31-24 @ 04:01) (65 - 70)  BP: 167/103 (10-31-24 @ 04:01) (141/89 - 167/103)  RR: 15 (10-31-24 @ 04:01) (15 - 15)  SpO2: 99% (10-31-24 @ 04:01) (96% - 99%)  I&O's Summary    Physical and Neurological Examination:   - General: nad supine in stretcher    - Mental Status:   level of consciousness: Awake, alert  Orientation: Oriented to person, age, place, and time  Language: Speech is fluent with intact naming, repetition, and ability to follow commands (including simple commands and complex cross commands)  Cortical Signs: No extinction to tactile DSS, no hemineglect.     - Cranial Nerves:   PERRL, Right hemianopia appeared dense on first exam, partial on subseuqent exam, EOMI, facial sensation is intact in the V1-V3 distribution bilaterally; face is symmetric with cecilia smile; hearing is intact to finger rub bilaterally and equally; uvula is midline and soft palate rises symmetrically; shoulder shrug intact; tongue protrudes in the midline with intact lateral movements  He has marked left beating/left rotary nystagmus which improves on right gaze, worsens on left gaze, present on primary gaze  Nystagmus is so pronounced as to     - Motor Testing:  Bulk: Normal   Tone: Normal  Strength:  There is no pronator drift b/l  There is no leg drift b/l  NO orbiting                               Right           Left  Should-Abduct      5                   5  Elbow-Flex             5                   5  Elbow-Ext              5                   5                        5                   5    Hip-Flex                 5                   5  Knee-Flex              5                   5  Knee-Ext                5                   5  - Reflexes:              Right           Left  - Sensory: Intact throughout to light touch.   - Coordination: Finger-nose-finger intact without dysmetria.  Heel shin without dystmetria  - Gait: Could stand but very unbalanced, further exam deferred due to fall risk    Objective Studies  Labs:                          13.7   10.54 )-----------( 216      ( 31 Oct 2024 02:48 )             42.3       10-31    139  |  100  |  14  ----------------------------<  121[H]  3.4[L]   |  26  |  1.28    Ca    9.6      31 Oct 2024 02:48    TPro  8.1  /  Alb  4.5  /  TBili  0.4  /  DBili  x   /  AST  23  /  ALT  18  /  AlkPhos  91  10-31      POCT Blood Glucose.: 120 mg/dL (31 Oct 2024 04:26)              CSF:                CNS Imaging:      ACC: [id number] EXAM:  CT BRAIN STROKE PROTOCOL   ORDERED BY: CEHN OLMEDO     PROCEDURE DATE:  10/31/2024          INTERPRETATION:  CLINICAL INFORMATION: Stroke Code, dizziness    COMPARISON: None.    CONTRAST:  IV Contrast: None  Complications: None    TECHNIQUE:  Serial axial images were obtained from the skull base to the   vertex using multi-slice helical technique. Sagittal and coronal   reformats were obtained.    FINDINGS:    VENTRICLES AND SULCI: Age appropriate involutional changes.  INTRA-AXIAL: No mass effect, acute hemorrhage, or midline shift.  There   are periventricular and subcortical white matter hypodensities,   consistent with microvascular type changes. Left occipital lobe   encephalomalacia.  EXTRA-AXIAL: No mass or fluid collection. Basal cisterns are normal in   appearance.    VISUALIZED SINUSES:  Clear.  TYMPANOMASTOID CAVITIES:  Clear.  VISUALIZED ORBITS: Normal.  CALVARIUM: Intact.    MISCELLANEOUS: None.      IMPRESSION:  No acute intracranial hemorrhage, mass effect, or midline shift. If   persistent concern for acute stroke, MRI could further evaluate in the   absence of contraindication.      Findings were discussed with Dr. Etienne 10/31/2024 4:41 AM by Dr. Morris   with read back confirmation.    --- End of Report ---          JOSÉ MORRIS DO; Resident Radiologist  This document has been electronically signed.  OZZY MARTINS MD; Attending Radiologist  This document has been electronically signed. Oct 31 2024  4:46AM    ACC: [id number] EXAM:  CT ANGIO NECK STROKE PROTCL IC   ORDERED BY:   CHEN OLMEDO     ACC: [id number] EXAM:  CT BRAIN PERFUSION MAPS STROKE   ORDERED BY:   CHEN OLMEDO     ACC: [id number] EXAM:  CT ANGIO BRAIN STROKE PROTC IC   ORDERED BY:   CHEN OLMEDO     PROCEDURE DATE:  10/31/2024          INTERPRETATION:  CLINICAL INFORMATION: Stroke Code, dizziness    COMPARISON: None.    CONTRAST:  IV Contrast: Omnipaque 350  50 cc administered  0 cc discarded  Complications: None reported attime of study completion    TECHNIQUE: CT perfusion and CTA of the head and neck were performed   following the intravenous administration of IV contrast. MIP   reconstructions were performed on a separate workstation and reviewed.   RAPID software was utilized for perfusion analysis.    FINDINGS:    CT PERFUSION:    TECHNICAL LIMITATIONS: None.    CBF<30%/CORE INFARCTION: 0 mL  TMAX>6s/UNDERPERFUSED TISSUE: 35 mL in the left occipital lobe/PCA   territory  MISMATCH VOLUME/TISSUE AT RISK: 35 mL in the left occipital lobe/PCA   territory  MISMATCH RATIO: Infinite.    MISCELLANEOUS: None.      CTA NECK:    AORTIC ARCH AND VISUALIZED GREAT VESSELS: Within normal limits.    RIGHT:  COMMON CAROTID ARTERY: No significant stenosis to the carotid bifurcation.  INTERNAL CAROTID ARTERY: Mild atherosclerosis No significant stenosis   based on NASCET criteria.  VERTEBRAL ARTERY: Dominant. Normal in course to the intracranial   circulation.    LEFT:  COMMON CAROTID ARTERY: No significant stenosis to the carotid bifurcation.  INTERNAL CAROTID ARTERY: Mild atherosclerosis. No significant stenosis   based on NASCET criteria.  VERTEBRAL ARTERY: Hypoplastic. Normal in course to the intracranial   circulation.    VISUALIZED LUNGS: Clear.    MISCELLANEOUS: Degenerative changes of the cervical spine.    CAROTID STENOSIS REFERENCE: Percent (%) stenosis is expressed in terms of   NASCET Criteria. (NASCET = 100x1-(N/D)). N=greatest narrowing. D=normal   distal diameter - MILD = <50% stenosis. - MODERATE = 50-69% stenosis. -   SEVERE = 70-89% stenosis. - HAIRLINE/CRITICAL = 90-99% stenosis. -   OCCLUDED = 100% stenosis.      CTA HEAD:    INTERNAL CAROTID ARTERIES: Atherosclerosis in the bilateral carotid   siphons. Suspected moderate stenosis in the supraclinoid segment of the   left ICA.    [place]: No aneurysm identified. Tiny aneurysms can be beyond   the resolution of CTA technique.    ANTERIOR CEREBRAL ARTERIES: No significant stenosis or occlusion.  MIDDLE CEREBRAL ARTERIES: No significant stenosis or occlusion.  POSTERIOR CEREBRAL ARTERIES: Proximal left PCA appears at least partially   occluded with the remainder of the left PCA severely diminutive in   caliber. Right PCA unremarkable.    DISTAL VERTEBRAL / BASILAR ARTERIES: Left vertebral artery hypoplastic   and terminates as the left PICA. Right vertebral artery and basilar   artery unremarkable.    VENOUS STRUCTURES: Inadequate venous phase opacification.    MISCELLANEOUS: No other vascular abnormality is seen.      IMPRESSION:    CT PERFUSION:  Technical limitations: None.    Core infarction: 0 ml  Penumbra / tissue at risk for active ischemia: 35 ml in the left   occipital lobe/PCA territory    CTA NECK:  No evidence of significant stenosis or occlusion.    CTA HEAD:  Proximal left PCA appears at least partially occluded with the remainder   of the left PCA severely diminutive in caliber.    Suspected moderate stenosis in the supraclinoid segment of the left ICA.    Remainder of the [place] of [place] grossly patent.    Findings discussed with Dr. Etienne by Dr. Morris at 5:05 AM with read back   confirmation.    --- End of Report ---          JOSÉ MORRIS DO; Resident Radiologist  This document has been electronically signed.  OZZY MARTINS MD; Attending Radiologist  This document has been electronically signed. Oct 31 2024  5:18AM

## 2024-10-31 NOTE — H&P ADULT - HISTORY OF PRESENT ILLNESS
53year old right handed Man with PMHx HTN, CVA (?residual gait abnormality) on aspirin presents c/o nausea, NBNB vomiting, and dizziness 30 minutes after eating African food last night at 6pm since resolved. In the ED, patient was noted to have marked left beating nystagmus and code stroke was called. Patient denies focal weakness, HA, or visual changes. CTH without bleed or infarct shows periventricular and subcortical white matter hypodensities, consistent with microvascular type changes. Left occipital lobe encephalomalacia. CTP diagnostic quality w/   35 mL in the left occipital lobe/PCA territory, no core infarct. CTA w/ mild athero b/l ICA in neck and siphons, no stenosis. Left ICA supraclinoid mod stenosis. Prox left PCA severe stenosis vs partial occlusion. Left vert hypoplastic. Question if left SCA also involved. In the ED neurology evaluated and patient was loaded with ASA and plavix. 53 year old PMH HTN, CVA (?residual gait abnormality) on aspirin presents c/o nausea, NBNB vomiting, and dizziness 30 minutes after eating African food last night at 6pm since resolved. In the ED, patient was noted to have marked left beating nystagmus and code stroke was called. Patient denies focal weakness, HA, or visual changes. CTH without acute bleed or infarct shows periventricular and subcortical white matter hypodensities, consistent with microvascular type changes. Left occipital lobe encephalomalacia. CTP diagnostic quality w/   35 mL in the left occipital lobe/PCA territory, no core infarct. CTA w/ mild athero b/l ICA in neck and siphons, no stenosis. Left ICA supraclinoid mod stenosis. Prox left PCA severe stenosis vs partial occlusion. Left vert hypoplastic. Question if left SCA also involved. In the ED neurology evaluated and patient was loaded with ASA and plavix.

## 2024-10-31 NOTE — H&P ADULT - NSHPREVIEWOFSYSTEMS_GEN_ALL_CORE
Review of Systems:   CONSTITUTIONAL: No fever, weight loss  EYES: No eye pain, visual disturbances, or discharge  ENMT:  No difficulty hearing, tinnitus, vertigo; No sinus or throat pain  RESPIRATORY: No SOB. No cough, wheezing, chills or hemoptysis  CARDIOVASCULAR: No chest pain, palpitations, dizziness  GASTROINTESTINAL: No abdominal or epigastric pain. No nausea, vomiting, or hematemesis; No diarrhea or constipation. No melena or hematochezia.  GENITOURINARY: No dysuria, frequency, hematuria, or incontinence  NEUROLOGICAL: No headaches, memory loss, loss of strength, numbness, or tremors  SKIN: No itching, burning, rashes, or lesions   LYMPH NODES: No enlarged glands  ENDOCRINE: No heat or cold intolerance; No hair loss  MUSCULOSKELETAL: No joint pain or swelling; No muscle, back pain  HEME/LYMPH: No easy bruising, or bleeding gums

## 2024-10-31 NOTE — OCCUPATIONAL THERAPY INITIAL EVALUATION ADULT - GENERAL OBSERVATIONS, REHAB EVAL
Patient received semisupine in bed in NAD; agreeable to participate in OT evaluation. +telemetry monitor. +zoll pads. /87 mmHg. HR 86 bpm.

## 2024-10-31 NOTE — SWALLOW BEDSIDE ASSESSMENT ADULT - PHARYNGEAL PHASE
Within functional limits initially with consecutive cup sips noted with throat clearing; additional 6 oz provided via single cup sips and noted with delayed throat clear/Throat clear post oral intake

## 2024-10-31 NOTE — ED ADULT NURSE NOTE - NSFALLUNIVINTERV_ED_ALL_ED
Bed/Stretcher in lowest position, wheels locked, appropriate side rails in place/Call bell, personal items and telephone in reach/Instruct patient to call for assistance before getting out of bed/chair/stretcher/Non-slip footwear applied when patient is off stretcher/[place] to call system/Physically safe environment - no spills, clutter or unnecessary equipment/Purposeful proactive rounding/Room/bathroom lighting operational, light cord in reach

## 2024-10-31 NOTE — ED PROVIDER NOTE - CLINICAL SUMMARY MEDICAL DECISION MAKING FREE TEXT BOX
53-year-old male with a past medical history of hypertension, CVA presents to the ED complaining of nausea vomiting and dizziness starting 30 minutes after eating  food.  Patient states associated symptoms began after eating after completing which she had 3 episodes of nonbilious nonbloody emesis.  Patient states sometimes he is forgetful but otherwise does not endorse any other symptoms. Physical exam as above,     Impression: will r/o vertigo vs dehydration due to viral gastritis    plan:    labs, supportive care

## 2024-10-31 NOTE — SWALLOW BEDSIDE ASSESSMENT ADULT - ADDITIONAL RECOMMENDATIONS
1. Continue to monitor for changes in neurological status that may impact PO intake. 2. Discussed with RN results/ recommendations who reported when drinking earlier patient with anterior loss (not observed during assessment at this time). 3. This service to follow for diet tolerance/ advancement as schedule permits. 4. Medical team advised to reconsult this service if patient is with a change in medical status or change in tolerance of recommended PO. 5. Following recommendations patient reports not eating and drinking together and typically ~15 minutes following PO and suggest possible reasoning for difficulty.

## 2024-10-31 NOTE — CHART NOTE - NSCHARTNOTEFT_GEN_A_CORE
Clinical HPI and patient imaging reviewed by INR Fellow and attending Dr. Sanches.   No role for emergent intervention in patient with concern for partially occlusive left PCA in the setting of chronic PCA stenosis; low NIHSS of 2; minimal penumbra territory on CT perfusion due to low benefit/risk ratio.   Recommend further care per stroke neurology team.

## 2024-10-31 NOTE — OCCUPATIONAL THERAPY INITIAL EVALUATION ADULT - PERTINENT HX OF CURRENT PROBLEM, REHAB EVAL
53 year old male with history of HTN and CVA presents with nausea, vomiting, dizziness, and weakness after eating food. CTA HEAD: Proximal left PCA appears at least partially occluded with the remainder of the left PCA severely diminutive in caliber. Suspected moderate stenosis in the supraclinoid segment of the left ICA. Remainder of the [place] grossly patent. CTA NECK: No evidence of significant stenosis or occlusion.

## 2024-10-31 NOTE — H&P ADULT - ASSESSMENT
53 year old PMH HTN, CVA presents c/o nausea, NBNB vomiting, and dizziness 30 minutes after eating African food last night at 6pm since resolved. In the ED, patient was noted to have marked left beating nystagmus and code stroke was called. CTH without acute bleed or infarct. CTP diagnostic quality w/   35 mL in the left occipital lobe/PCA territory, no core infarct. CTA w/ left ICA supraclinoid mod stenosis, prox left PCA severe stenosis vs partial occlusion. 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

## 2024-10-31 NOTE — H&P ADULT - PROBLEM SELECTOR PLAN 4
/90  Hold off home home amlodipine and losartan at this time to allow for permissive hypertension as above  Monitor BP

## 2024-10-31 NOTE — H&P ADULT - NSHPPHYSICALEXAM_GEN_ALL_CORE
Vital Signs Last 24 Hrs  T(C): 36.5 (31 Oct 2024 12:29), Max: 37.1 (31 Oct 2024 00:30)  T(F): 97.7 (31 Oct 2024 12:29), Max: 98.8 (31 Oct 2024 00:30)  HR: 75 (31 Oct 2024 12:29) (65 - 78)  BP: 148/89 (31 Oct 2024 12:29) (126/90 - 167/103)  BP(mean): 88 (31 Oct 2024 04:25) (88 - 88)  RR: 19 (31 Oct 2024 12:29) (15 - 28)  SpO2: 97% (31 Oct 2024 12:29) (96% - 100%)    Parameters below as of 31 Oct 2024 12:29  Patient On (Oxygen Delivery Method): room air        CONSTITUTIONAL: Well-groomed, in no apparent distress  EYES: No conjunctival or scleral injection, non-icteric;   ENMT: No external nasal lesions; MMM  NECK: Trachea midline without palpable neck mass; thyroid not enlarged and non-tender  RESPIRATORY: Breathing comfortably; no dullness to percussion; lungs CTA without wheeze/rhonchi/rales  CARDIOVASCULAR: +S1S2, RRR, no M/G/R; pedal pulses full and symmetric; no lower extremity edema  GASTROINTESTINAL: No palpable masses or tenderness, +BS throughout, no rebound/guarding; no hepatosplenomegaly; no hernia palpated  LYMPHATIC: No cervical LAD or tenderness  SKIN: No rashes or ulcers noted  NEUROLOGIC:   PERRLA b/l, Right hemianopia, EOMI, facial sensation is intact in the V1-V3 distribution bilaterally; face is symmetric with cecilia smile; hearing is intact to finger rub bilaterally and equally; uvula is midline and soft palate rises symmetrically; shoulder shrug intact; tongue protrudes in the midline with intact lateral movements  He has marked left beating/left rotary nystagmus which improves on right gaze, worsens on left gaze, present on primary gaze  - Sensory: Intact throughout to light touch.   - Coordination: Finger-nose-finger intact without dysmetria.   - Gait: Could stand but very unbalanced  PSYCHIATRIC: A+O x 3; mood and affect appropriate; appropriate insight and judgment  MSK: 5/5 strength b/l UE and LE, FROM b/l UE and LE

## 2024-10-31 NOTE — ED ADULT TRIAGE NOTE - CHIEF COMPLAINT QUOTE
alert oriented creole speaking wife translating c/o N/V started 30 min ago after eating african food vomited x 3 hx CVA no physical deficits is some times forgetful  states he feels dizzy hx HTN  c/o generalized weakness  was too weak to walk on scene alert oriented creole speaking wife translating c/o N/V started 30 min ago after eating african food vomited x 3 hx CVA no physical deficits is some times forgetful  states he feels dizzy hx HTN  c/o generalized weakness  was too weak to walk on scene  Dr butts called to evaluate no code stroke called

## 2024-10-31 NOTE — PHYSICAL THERAPY INITIAL EVALUATION ADULT - GENERAL OBSERVATIONS, REHAB EVAL
Pt encountered in semisupine position, no distress, AxOx4, with +IV, +tele, and +pulse oximeter. Pt agreeable to participate in PT evaluation. Vitals taken; /87mmHg, heart rate 80bpm, SpO2 99% on room air.

## 2024-10-31 NOTE — ED PROVIDER NOTE - OBJECTIVE STATEMENT
53-year-old male with a past medical history of hypertension, CVA presents to the ED complaining of nausea vomiting and dizziness starting 30 minutes after eating  food.  Patient states associated symptoms began after eating after completing which she had 3 episodes of nonbilious nonbloody emesis.  Patient states sometimes he is forgetful but otherwise does not endorse any other symptoms.  Patient denies chest pain, shortness of breath, fevers, chills, diarrhea, constipation, urinary/fecal incontinence or retention, leg pain/swelling or any other concerning symptoms at this time.

## 2024-10-31 NOTE — ED ADULT NURSE NOTE - OBJECTIVE STATEMENT
Pt AxOx4. Pt presents to ED with c/o of dizziness, N/V that started today. Pt had one emesis episode in ED . In no apparent distress. Care ongoing

## 2024-10-31 NOTE — ED ADULT NURSE REASSESSMENT NOTE - NS ED NURSE REASSESS COMMENT FT1
As per Telenurse , pt went into VTACH on monitor @1054. Pt placed on monitor. EKG in process. Pt denies any s/s.
Code Stroke called @4:26 am , after MD De Paz assessed pt . As per MD , pt noted to have gaze . No Deficits noted. Pt speaking in full sentences.
Patient is A&O x 4 on the cardiac monitor presenting in normal sinus rhythm. Denies any dizziness at this time, reports a mild headache 5/10. Patients  is at the bedside with him
Patient is A&O x 4, upon administration of Potassium Phosphate patient threw up 400 mls of clear fluid, in the last few heaves it became green bile colored. MD notified of med not being properly absorbed. Patient reported slight relief after throwing up. While doing orthostatics hypotensions, Patient displayed right sided weakness with leg shaking while standing and leaning to the right.
break coverage RN: pt resting comfortably at this time. Patient returned from CT, repeat labs sent. pending results. NSR on cardiac monitor. No complaints of chest pain, headache, nausea, dizziness, vomiting  SOB, fever, chills verbalized. 20GRAC in place, fluids running.
Patient received from night shift nurse, resting comfortably in bed. On cardiac monitor presenting in NSR. Neuro check reveals left sided Nystagmus, equal strength in all extremities, PERRLA, Sensation is equal in all extremities an facial symmetry is displayed. Patient drained 400 ml of clear straw colored urine in urinal. awaiting bed placement.

## 2024-10-31 NOTE — PHYSICAL THERAPY INITIAL EVALUATION ADULT - ADDITIONAL COMMENTS
Pt lives in a basement apartment with his wife, wife's child, and wife's child's father with >15 steps to negotiate. Prior to hospital admission, pt was completely independent and used no assistive device with ambulation. Pt denies any recent falls.    Pt left comfortable in bed, NAD, all lines intact, all precautions maintained, and RN aware of PT evaluation.

## 2024-10-31 NOTE — PHYSICAL THERAPY INITIAL EVALUATION ADULT - PERTINENT HX OF CURRENT PROBLEM, REHAB EVAL
Pt is a 53 year old [place] Creole speaking male with PMHx HTN and CVA that presents to [facility] for Nausea, vomiting, dizziness, and weakness after eating African American food. CTA HEAD: Proximal left PCA appears at least partially occluded with the remainder of the left PCA severely diminutive in caliber. Suspected moderate stenosis in the supraclinoid segment of the left ICA. Remainder of the [place] grossly patent. CTA NECK: No evidence of significant stenosis or occlusion.

## 2024-10-31 NOTE — H&P ADULT - NSHPLABSRESULTS_GEN_ALL_CORE
LABS:                        13.7   10.54 )-----------( 216      ( 31 Oct 2024 02:48 )             42.3     10-31    139  |  100  |  14  ----------------------------<  121[H]  3.4[L]   |  26  |  1.28    Ca    9.6      31 Oct 2024 02:48  Phos  2.3     10-31  Mg     2.00     10-31    TPro  8.1  /  Alb  4.5  /  TBili  0.4  /  DBili  x   /  AST  23  /  ALT  18  /  AlkPhos  91  10-31    PT/INR - ( 31 Oct 2024 05:09 )   PT: 13.2 sec;   INR: 1.14 ratio         PTT - ( 31 Oct 2024 05:09 )  PTT:27.1 sec      Urinalysis Basic - ( 31 Oct 2024 02:48 )    Color: x / Appearance: x / SG: x / pH: x  Gluc: 121 mg/dL / Ketone: x  / Bili: x / Urobili: x   Blood: x / Protein: x / Nitrite: x   Leuk Esterase: x / RBC: x / WBC x   Sq Epi: x / Non Sq Epi: x / Bacteria: x

## 2024-10-31 NOTE — SWALLOW BEDSIDE ASSESSMENT ADULT - ORAL PREPARATORY PHASE
What to expect with your Nephrectomy.  [facility]  After surgery  You may or may not have a drain that is shaped like a grenade and put to suction  This drain usually may or may not come out on Post op day 1. If you go home with a drain, the nurses will teach you how to record the output and you will come back 3-5 days after you leave to have the drain removed in clinic.  You will have a catheter after your surgery. It should come out the next day and you should be able to void on your own before you leave. If you are a male and on a BPH medicine, we will need to make sure you restart that the night of your surgery.  The night of surgery we expect and hope that you will:  Walk - walking helps get the bowels moving. Also after your surgery, you are at a risk for a deep venous thrombosis (which is a clot in the legs that can form by remaining inactive or still for extended periods of time) and this can travel to your lungs and make you feel short of breath. This is a very serious condition. Walking helps prevent a DVT from occurring.  Eat - you do not have to eat a whole meal, but we want to make sure you can tolerate liquid and/or solid food without nausea and vomiting  Use your incentive spirometer - this is the breathing apparatus that helps you expand your lungs. If and when you have pain you will not want to take deep breaths. But if you dont take deep breaths, you are at risk for pneumonia. The incentive spirometer will help prevent this from occurring by expanding your lungs.  Symptoms you may experience immediately post-op:  Bloating and/or shoulder pain if you had a laparoscopic procedure - when we do this operation, we fill up your abdominal cavity with gas to better help us visualize the organs and allow our instruments to fit. After the surgery, not all the air can be removed and your body will eventually absorb this small amount of air. However this can make you feel bloated. In addition, when you  sit up, the air can sit right under a muscle (the diaphragm) which has connecting nerves to the shoulders, which could explain why you have shoulder pain.  Do not expect necessarily to have gas or to have a bowel movement - this goes along with the bloating, you may feel like you want to pass gas or have a bowel movement but you cant. This is normal and you will feel like this for a couple days. There are no pills to help with this. Small walks throughout the day should help with this.  Pain - your pain should be able to be controlled with medicines by mouth that we prescribe. It is important for you to tell us if you are on any pain medications at home before the surgery as you may need stronger pain meds while in the hospital.  You can go home when:  Pain is controlled with medicines by mouth  You are able to walk without difficulty or pain  You are tolerating a regulating diet  Your are voiding. If you cannot void we may have to replace a catheter and you will follow up 3-5 days after you leave to have a voiding trial. The nurses will teach you how to care for your catheter.  When you go home:  Activity  Continue to walk - small walks throughout the day are better than one long walk.   Do not lift anything greater than 8 pounds for 6 weeks - we want your abdominal wall muscles to heal.  Bowel Movements - Do not strain to have a bowel movement - the pain medicines will make you constipated. That is why we also ask you to take colace 2-3 x per day to help keep your bowels regular. If you are still having trouble, then you can also add Miralax once a day. Do not take any stool softeners if you are having diarrhea.  Drain - If you have a drain (not your catheter, but a separate drain) then record the output and bring it with you to your next appointment  Smoking - If you smoke, we encourage you STOP. Smoking interferes with the healing process and your prolong your healing with continued smoking.  Driving - Do not  drive while you are on pain meds or with your catheter in place.  Bathing - If you do not have a drain, you can shower 48 hours after your surgery. If you do have a drain, sponge bathe only until the drain is out.  Dressing - you can remove the dressings if there is no drainage or change them as needed if there is. The little sterile band-aid strips will fall off on their own in 10-14 days. If they have not fallen off then you can remove them yourself.  Restarting medicines -especially blood thinners (Aspirin, Plavix, Coumadin), Fish Oil. Discuss this with your physician prior to discharge.  When to return to the ER  Fever - If you have a fever >101.5, this could be due to a number of reasons such as infection of the urine or incision. If your catheter has been removed, you could possibly have a leak. It would be best to come to the ER so they can better evaluate you.  Severe pain - pain is expected, but severe or new onset of pain is not normal.   Inability to tolerate food or liquid with nausea and vomiting - it would be best to go to the ER for them to better evaluate you.    Within functional limits Decreased mastication ability

## 2024-10-31 NOTE — OCCUPATIONAL THERAPY INITIAL EVALUATION ADULT - NSOTDISCHREC_GEN_A_CORE
TBD pending further assessment of functional abilities when able. Patient currently on bedrest. OT to continue to follow.

## 2024-10-31 NOTE — OCCUPATIONAL THERAPY INITIAL EVALUATION ADULT - DIAGNOSIS, OT EVAL
s/p dizziness, s/p r/o CVA, s/p left PCA partial occlusion; decreased strength, decreased ability to perform ADLs

## 2024-10-31 NOTE — SWALLOW BEDSIDE ASSESSMENT ADULT - H & P REVIEW
Neuro 10/31, "Impression: Vertigo and right homonymous hemianopia localizing to left posterior dysfunction due to ICAD vs occlusions (ESUS). Recommendations: No tenecteplase because outside window, no intervention per MARGARETH due to high risk"/yes

## 2024-10-31 NOTE — SWALLOW BEDSIDE ASSESSMENT ADULT - COMMENTS
CTA Head 10/31: IMPRESSION: CT PERFUSION: Technical limitations: None. Core infarction: 0 ml Penumbra / tissue at risk for active ischemia: 35 ml in the left occipital lobe/PCA territory CTA NECK: No evidence of significant stenosis or occlusion. CTA HEAD: Proximal left PCA appears at least partially occluded with the remainder of the left PCA severely diminutive in caliber. Suspected moderate stenosis in the supraclinoid segment of the left ICA. Remainder of the [place] of [place] grossly patent.    No recent chest imaging.     Patient received in ED on stretcher, RN present providing orthostatics. Once completed patient seen for a bedside swallow evaluation. Patient's primary language [place] Creole, patient's  Maxim (also related to the patient per report) present at bedside and requesting to provide translation per patient wishes. Patient denies difficulty with speech/ language output currently. Patient denies difficulty with PO prior to admission.

## 2024-10-31 NOTE — PATIENT PROFILE ADULT - FALL HARM RISK - HARM RISK INTERVENTIONS
Assistance with ambulation/Assistance OOB with selected safe patient handling equipment/Communicate Risk of Fall with Harm to all staff/Discuss with provider need for PT consult/Monitor gait and stability/Reinforce activity limits and safety measures with patient and family/Tailored Fall Risk Interventions/Visual Cue: Yellow wristband and red socks/Bed in lowest position, wheels locked, appropriate side rails in place/Call bell, personal items and telephone in reach/Instruct patient to call for assistance before getting out of bed or chair/Non-slip footwear when patient is out of bed/[place] to call system/Physically safe environment - no spills, clutter or unnecessary equipment/Purposeful Proactive Rounding/Room/bathroom lighting operational, light cord in reach

## 2024-10-31 NOTE — SWALLOW BEDSIDE ASSESSMENT ADULT - ORAL PHASE
suspect piecemeal transfer/ deglutition/Decreased anterior-posterior movement of the bolus suspect piecemeal transfer/ deglutition

## 2024-10-31 NOTE — ED ADULT NURSE NOTE - CHIEF COMPLAINT QUOTE
alert oriented creole speaking wife translating c/o N/V started 30 min ago after eating african food vomited x 3 hx CVA no physical deficits is some times forgetful  states he feels dizzy hx HTN  c/o generalized weakness  was too weak to walk on scene  Dr butts called to evaluate no code stroke called

## 2024-10-31 NOTE — PATIENT PROFILE ADULT - FUNCTIONAL ASSESSMENT - BASIC MOBILITY ASSESSMENT TYPE
Patient ID: Yee is a 79 year old female.    Chief Complaint   Patient presents with   • Follow-up     HPI  Yee is here today for follow-up.  She did see Dr. Easton today, and she is increased her furosemide to 40 mg twice a day.  She still has issues with leg swelling, and has a history of congestive heart failure.  Currently she is having some issues with incontinence, she says that she has urge incontinence, and typically wears incontinence pads.  She is been having some issues with a rash in the gluteal fold which is now causing her some pain.  She wants me to check it for her.  We reviewed her labs, which were actually pretty good.  She notes she is also having some issues with numbness and feeling cold feeling in both her fourth and fifth fingers of the left hand.  She denies any problems with neck pain, her elbow trauma or pain.       Past Medical History:   Diagnosis Date   • Asthma    • Cataract    • Congestive heart failure (CHF) (CMS/[facility])     Ejection fraction 38% January 2015   • Hypertension    • Hypothyroidism     Status post radioactive iodine in 1968, had goiter   • ICD (implantable cardioverter-defibrillator) in place     Left ventricular lead capped   • Impaired glucose tolerance    • Myocardial infarction (CMS/HCC) 2009    Happened after her spine surgery   • Obstructive sleep apnea     Unable to tolerate CPAP   • Pacemaker 2010   • Spinal stenosis     Surgery 2009 at [facility], patient coded on the table     Family History   Problem Relation Age of Onset   • Cancer, Colon Mother    • Hypertension Sister    • Diabetes Brother      Past Surgical History:   Procedure Laterality Date   • Bunionectomy     • Cataract extraction w/  intraocular lens implant Left    • Hysterectomy     • Laminectomy  1972   • Pacemaker     • Spine surgery     • Total knee replacement Bilateral     right 2003., left 2009     Social History     Socioeconomic History   • Marital status: Single     Spouse  name: Not on file   • Number of children: 0   • Years of education: Not on file   • Highest education level: Not on file   Occupational History   • Occupation: retired    Social Needs   • Financial resource strain: Not on file   • Food insecurity     Worry: Not on file     Inability: Not on file   • Transportation needs     Medical: Not on file     Non-medical: Not on file   Tobacco Use   • Smoking status: Former Smoker     Years: 20.00     Quit date:      Years since quittin.2   • Smokeless tobacco: Never Used   Substance and Sexual Activity   • Alcohol use: Not Currently   • Drug use: Not Currently   • Sexual activity: Not Currently   Lifestyle   • Physical activity     Days per week: Not on file     Minutes per session: Not on file   • Stress: Not on file   Relationships   • Social connections     Talks on phone: Not on file     Gets together: Not on file     Attends Zoroastrianism service: Not on file     Active member of club or organization: Not on file     Attends meetings of clubs or organizations: Not on file     Relationship status: Not on file   • Intimate partner violence     Fear of current or ex partner: Not on file     Emotionally abused: Not on file     Physically abused: Not on file     Forced sexual activity: Not on file   Other Topics Concern   • Not on file   Social History Narrative   • Not on file     Current Outpatient Medications   Medication Sig Dispense Refill   • metoPROLOL tartrate (LOPRESSOR) 50 MG tablet TAKE 1 TABLET BY MOUTH EVERY MORNING AND TAKE 2 TABLETS BY MOUTH EVERY EVENING     • amLODIPine (NORVASC) 5 MG tablet Take 5 mg by mouth daily.      • aspirin 81 MG chewable tablet Chew 81 mg by mouth daily.     • calcium carbonate, antacid, 648 MG tablet Take 648 mg by mouth daily.      • carvedilol (COREG) 25 MG tablet Take 25 mg by mouth 2 times daily (with meals).      • furosemide (LASIX) 20 MG tablet Take 40 mg by mouth 2 times daily.     • Dulera 200-5 MCG/ACT  inhaler 2 times daily.      • Multiple Vitamins-Minerals (ICaps Lutein & Omega-3) Cap Take 1 tablet by mouth daily.     • lisinopril (ZESTRIL) 40 MG tablet Take 40 mg by mouth daily.      • nystatin (MYCOSTATIN) 244115 UNIT/GM ointment Apply topically 2 times daily.      • albuterol 108 (90 Base) MCG/ACT inhaler Inhale 1-2 puffs into the lungs every 4 hours as needed for Shortness of Breath.      • simvastatin (ZOCOR) 20 MG tablet TAKE 1 TABLET EVERY EVENING     • tiotropium (Spiriva HandiHaler) 18 MCG capsule for inhaler Place 18 mcg into inhaler and inhale daily.      • triamcinolone (ARISTOCORT) 0.1 % cream 2 times daily.      • Synthroid 100 MCG tablet Take 100 mcg by mouth daily.      • azelastine (ASTELIN) 0.1 % nasal spray Spray 1 spray in each nostril 2 times daily. Use in each nostril as directed 90 mL 3   • silver sulfADIAZINE (THERMAZENE) 1 % cream Apply topically daily. Apply to a thickness of 1/16 inch (\"nickel thick\"). 50 g 3     No current facility-administered medications for this visit.      ALLERGIES:   Allergen Reactions   • Adhesive   (Environmental) RASH   • Atorvastatin DIARRHEA   • Penicillins RASH     rash   • Streptomycin RASH     Anxiety, blurry vision       Review of Systems   Constitutional: Positive for fatigue. Negative for appetite change, chills, fever and unexpected weight change.   HENT: Negative for postnasal drip, sore throat and trouble swallowing.    Eyes: Negative for pain, discharge and visual disturbance.   Respiratory: Positive for shortness of breath (With exertion). Negative for chest tightness and wheezing.    Cardiovascular: Positive for leg swelling. Negative for chest pain and palpitations.   Gastrointestinal: Negative for abdominal pain, constipation and diarrhea.   Endocrine: Negative for cold intolerance and heat intolerance.   Genitourinary: Negative for difficulty urinating, dysuria and frequency.   Musculoskeletal: Negative for arthralgias, gait problem and  myalgias.   Skin: Positive for rash (Gluteal fold.). Negative for color change.   Allergic/Immunologic: Negative for environmental allergies.   Neurological: Positive for numbness (Fourth and fifth fingers of the left hand). Negative for dizziness, weakness and headaches.        Decreased sensation left leg.  This is been status post her surgery in the past   Hematological: Negative for adenopathy.   Psychiatric/Behavioral: Negative for dysphoric mood. The patient is not nervous/anxious.        Visit Vitals  /72   Pulse 100   Temp 97.6 °F (36.4 °C) (Temporal)   Resp 20   Ht 5' 2\" (1.575 m)   Wt 126.1 kg (278 lb)   LMP 06/22/1967 (Approximate)   SpO2 98%   BMI 50.85 kg/m²     Physical Exam  Constitutional:       Appearance: Normal appearance. She is well-developed. She is obese.   HENT:      Head: Normocephalic.   Eyes:      Conjunctiva/sclera: Conjunctivae normal.      Pupils: Pupils are equal, round, and reactive to light.   Neck:      Musculoskeletal: Normal range of motion and neck supple.      Thyroid: No thyromegaly.      Comments: No JVD  Cardiovascular:      Rate and Rhythm: Normal rate and regular rhythm.      Heart sounds: Normal heart sounds. No murmur.   Pulmonary:      Effort: Pulmonary effort is normal. No respiratory distress.      Breath sounds: Normal breath sounds. No stridor. No wheezing.   Abdominal:      General: Bowel sounds are normal. There is no distension.      Palpations: Abdomen is soft. There is no mass.      Tenderness: There is no abdominal tenderness.   Musculoskeletal:         General: No deformity.      Right lower leg: Edema present.      Left lower leg: Edema present.      Comments: Status post bilateral knee replacement patient has 1+ edema both ankles.  Also pretibial.   Lymphadenopathy:      Cervical: No cervical adenopathy.   Skin:     General: Skin is warm and dry.      Findings: No erythema or rash.      Comments: Patient has a rash in the gluteal fold, and appears to  be some breakdown of skin, more on the right side than the left side.  There is also depigmentation of the skin.   Neurological:      General: No focal deficit present.      Mental Status: She is alert and oriented to person, place, and time.      Cranial Nerves: No cranial nerve deficit.   Psychiatric:         Mood and Affect: Mood normal.         Behavior: Behavior normal.             Lab Services on 02/03/2021   Component Date Value Ref Range Status   • Sodium 02/03/2021 141  135 - 145 mmol/L Final   • Potassium 02/03/2021 4.1  3.4 - 5.1 mmol/L Final   • Chloride 02/03/2021 101  98 - 107 mmol/L Final   • Carbon Dioxide 02/03/2021 30  21 - 32 mmol/L Final   • Anion Gap 02/03/2021 14  10 - 20 mmol/L Final   • Glucose 02/03/2021 81  65 - 99 mg/dL Final   • BUN 02/03/2021 14  6 - 20 mg/dL Final   • Creatinine 02/03/2021 0.75  0.51 - 0.95 mg/dL Final   • Glomerular Filtration Rate 02/03/2021 75* >90 mL/min/1.73m2 Final   • BUN/ Creatinine Ratio 02/03/2021 19  7 - 25 Final   • Calcium 02/03/2021 9.4  8.4 - 10.2 mg/dL Final   • Bilirubin, Total 02/03/2021 0.6  0.2 - 1.0 mg/dL Final   • GOT/AST 02/03/2021 24  <=37 Units/L Final   • GPT/ALT 02/03/2021 20  <64 Units/L Final   • Alkaline Phosphatase 02/03/2021 102  45 - 117 Units/L Final   • Albumin 02/03/2021 3.5* 3.6 - 5.1 g/dL Final   • Protein, Total 02/03/2021 7.1  6.4 - 8.2 g/dL Final   • Globulin 02/03/2021 3.6  2.0 - 4.0 g/dL Final   • A/G Ratio 02/03/2021 1.0  1.0 - 2.4 Final   • Cholesterol 02/03/2021 166  <=199 mg/dL Final   • Triglycerides 02/03/2021 78  <=149 mg/dL Final   • HDL 02/03/2021 56  >=50 mg/dL Final   • LDL 02/03/2021 94  <=129 mg/dL Final   • Non-HDL Cholesterol 02/03/2021 110  mg/dL Final   • Cholesterol/ HDL Ratio 02/03/2021 3.0  <=4.4 Final   • Hemoglobin A1C 02/03/2021 5.9* 4.5 - 5.6 % Final   • WBC 02/03/2021 4.4  4.2 - 11.0 K/mcL Final   • RBC 02/03/2021 4.83  4.00 - 5.20 mil/mcL Final   • HGB 02/03/2021 11.7* 12.0 - 15.5 g/dL Final   • HCT  02/03/2021 38.7  36.0 - 46.5 % Final   • MCV 02/03/2021 80.1  78.0 - 100.0 fl Final   • MCH 02/03/2021 24.2* 26.0 - 34.0 pg Final   • MCHC 02/03/2021 30.2* 32.0 - 36.5 g/dL Final   • RDW-CV 02/03/2021 14.9  11.0 - 15.0 % Final   • RDW-SD 02/03/2021 43.1  39.0 - 50.0 fL Final   • PLT 02/03/2021 156  140 - 450 K/mcL Final   • NRBC 02/03/2021 0  <=0 /100 WBC Final   • Neutrophil, Percent 02/03/2021 53  % Final   • Lymphocytes, Percent 02/03/2021 35  % Final   • Mono, Percent 02/03/2021 9  % Final   • Eosinophils, Percent 02/03/2021 2  % Final   • Basophils, Percent 02/03/2021 1  % Final   • Immature Granulocytes 02/03/2021 0  % Final   • Absolute Neutrophils 02/03/2021 2.4  1.8 - 7.7 K/mcL Final   • Absolute Lymphocytes 02/03/2021 1.5  1.0 - 4.0 K/mcL Final   • Absolute Monocytes 02/03/2021 0.4  0.3 - 0.9 K/mcL Final   • Absolute Eosinophils  02/03/2021 0.1  0.0 - 0.5 K/mcL Final   • Absolute Basophils 02/03/2021 0.0  0.0 - 0.3 K/mcL Final   • Absolute Immmature Granulocytes 02/03/2021 0.0  0.0 - 0.2 K/mcL Final   • TSH 02/03/2021 1.518  0.350 - 5.000 mcUnits/mL Final          Yee was seen today for follow-up.    Diagnoses and all orders for this visit:    Paresthesia  -     [facility] EMG/NERVE CONDUCTION STUDY; Future    Essential hypertension    Chronic combined systolic and diastolic congestive heart failure (CMS/HCC)    Urge incontinence of urine    Dermatitis    Other orders  -     silver sulfADIAZINE (THERMAZENE) 1 % cream; Apply topically daily. Apply to a thickness of 1/16 inch (\"nickel thick\").      Problem List Items Addressed This Visit        Circulatory    Hypertension    Relevant Medications    metoPROLOL tartrate (LOPRESSOR) 50 MG tablet    Congestive heart failure (CHF) (CMS/[facility])    Relevant Medications    metoPROLOL tartrate (LOPRESSOR) 50 MG tablet       Urinary    Urge incontinence of urine       Integumentary    Dermatitis      Other Visit Diagnoses     Paresthesia    -  Primary    Relevant Orders     [facility] EMG/NERVE CONDUCTION STUDY      Patient is here today for follow-up.  Currently she is having issues with urge incontinence.  We talked about putting her on a toileting schedule, to see if that would help.  She has breakdown of some of the skin between the buttocks, more on the right side than the left side.  Were going to use silver Silvadene for 1 week, then she can switch back to a barrier method such as baby ointment.  She has combined systolic and diastolic heart failure, she saw cardiology today.  They have increased the dose of her furosemide to 40 mg twice a day.  She also is having some issues with paresthesias on the left hand, the fourth and fifth fingers so I am going to go ahead and have her do a EMG of the upper extremities.  I have asked her to follow-up with me in 4 months.    Ann Marie Peters MD  3/3/2021       Admission

## 2024-11-01 ENCOUNTER — NON-APPOINTMENT (OUTPATIENT)
Age: 54
End: 2024-11-01

## 2024-11-01 DIAGNOSIS — Z86.79 PERSONAL HISTORY OF OTHER DISEASES OF THE CIRCULATORY SYSTEM: ICD-10-CM

## 2024-11-01 DIAGNOSIS — H53.462 HOMONYMOUS BILATERAL FIELD DEFECTS, LEFT SIDE: ICD-10-CM

## 2024-11-01 LAB
A1C WITH ESTIMATED AVERAGE GLUCOSE RESULT: 5.6 % — SIGNIFICANT CHANGE UP (ref 4–5.6)
ANION GAP SERPL CALC-SCNC: 14 MMOL/L — SIGNIFICANT CHANGE UP (ref 7–14)
ANION GAP SERPL CALC-SCNC: 15 MMOL/L — HIGH (ref 7–14)
BUN SERPL-MCNC: 11 MG/DL — SIGNIFICANT CHANGE UP (ref 7–23)
BUN SERPL-MCNC: 16 MG/DL — SIGNIFICANT CHANGE UP (ref 7–23)
CALCIUM SERPL-MCNC: 9.4 MG/DL — SIGNIFICANT CHANGE UP (ref 8.4–10.5)
CALCIUM SERPL-MCNC: 9.7 MG/DL — SIGNIFICANT CHANGE UP (ref 8.4–10.5)
CHLORIDE SERPL-SCNC: 100 MMOL/L — SIGNIFICANT CHANGE UP (ref 98–107)
CHLORIDE SERPL-SCNC: 96 MMOL/L — LOW (ref 98–107)
CHOLEST SERPL-MCNC: 139 MG/DL — SIGNIFICANT CHANGE UP
CO2 SERPL-SCNC: 25 MMOL/L — SIGNIFICANT CHANGE UP (ref 22–31)
CO2 SERPL-SCNC: 26 MMOL/L — SIGNIFICANT CHANGE UP (ref 22–31)
CREAT SERPL-MCNC: 1.09 MG/DL — SIGNIFICANT CHANGE UP (ref 0.5–1.3)
CREAT SERPL-MCNC: 1.12 MG/DL — SIGNIFICANT CHANGE UP (ref 0.5–1.3)
EGFR: 78 ML/MIN/1.73M2 — SIGNIFICANT CHANGE UP
EGFR: 80 ML/MIN/1.73M2 — SIGNIFICANT CHANGE UP
ESTIMATED AVERAGE GLUCOSE: 114 — SIGNIFICANT CHANGE UP
GLUCOSE SERPL-MCNC: 95 MG/DL — SIGNIFICANT CHANGE UP (ref 70–99)
GLUCOSE SERPL-MCNC: 96 MG/DL — SIGNIFICANT CHANGE UP (ref 70–99)
HCT VFR BLD CALC: 42.1 % — SIGNIFICANT CHANGE UP (ref 39–50)
HDLC SERPL-MCNC: 41 MG/DL — SIGNIFICANT CHANGE UP
HGB BLD-MCNC: 13.7 G/DL — SIGNIFICANT CHANGE UP (ref 13–17)
LIPID PNL WITH DIRECT LDL SERPL: 85 MG/DL — SIGNIFICANT CHANGE UP
MAGNESIUM SERPL-MCNC: 2.1 MG/DL — SIGNIFICANT CHANGE UP (ref 1.6–2.6)
MAGNESIUM SERPL-MCNC: 2.1 MG/DL — SIGNIFICANT CHANGE UP (ref 1.6–2.6)
MCHC RBC-ENTMCNC: 22 PG — LOW (ref 27–34)
MCHC RBC-ENTMCNC: 32.5 G/DL — SIGNIFICANT CHANGE UP (ref 32–36)
MCV RBC AUTO: 67.7 FL — LOW (ref 80–100)
NON HDL CHOLESTEROL: 98 MG/DL — SIGNIFICANT CHANGE UP
NRBC # BLD: 0 /100 WBCS — SIGNIFICANT CHANGE UP (ref 0–0)
NRBC # FLD: 0 K/UL — SIGNIFICANT CHANGE UP (ref 0–0)
PHOSPHATE SERPL-MCNC: 2.4 MG/DL — LOW (ref 2.5–4.5)
PHOSPHATE SERPL-MCNC: 2.8 MG/DL — SIGNIFICANT CHANGE UP (ref 2.5–4.5)
PLATELET # BLD AUTO: 219 K/UL — SIGNIFICANT CHANGE UP (ref 150–400)
POTASSIUM SERPL-MCNC: 2.8 MMOL/L — CRITICAL LOW (ref 3.5–5.3)
POTASSIUM SERPL-MCNC: 3.5 MMOL/L — SIGNIFICANT CHANGE UP (ref 3.5–5.3)
POTASSIUM SERPL-SCNC: 2.8 MMOL/L — CRITICAL LOW (ref 3.5–5.3)
POTASSIUM SERPL-SCNC: 3.5 MMOL/L — SIGNIFICANT CHANGE UP (ref 3.5–5.3)
RBC # BLD: 6.22 M/UL — HIGH (ref 4.2–5.8)
RBC # FLD: 15.7 % — HIGH (ref 10.3–14.5)
SODIUM SERPL-SCNC: 136 MMOL/L — SIGNIFICANT CHANGE UP (ref 135–145)
SODIUM SERPL-SCNC: 140 MMOL/L — SIGNIFICANT CHANGE UP (ref 135–145)
TRIGL SERPL-MCNC: 66 MG/DL — SIGNIFICANT CHANGE UP
WBC # BLD: 7.1 K/UL — SIGNIFICANT CHANGE UP (ref 3.8–10.5)
WBC # FLD AUTO: 7.1 K/UL — SIGNIFICANT CHANGE UP (ref 3.8–10.5)

## 2024-11-01 PROCEDURE — 70544 MR ANGIOGRAPHY HEAD W/O DYE: CPT | Mod: 26,59

## 2024-11-01 PROCEDURE — 99233 SBSQ HOSP IP/OBS HIGH 50: CPT

## 2024-11-01 PROCEDURE — 70548 MR ANGIOGRAPHY NECK W/DYE: CPT | Mod: 26

## 2024-11-01 PROCEDURE — 70552 MRI BRAIN STEM W/DYE: CPT | Mod: 26

## 2024-11-01 PROCEDURE — 74230 X-RAY XM SWLNG FUNCJ C+: CPT | Mod: 26

## 2024-11-01 RX ORDER — LOSARTAN POTASSIUM 25 MG/1
25 TABLET ORAL DAILY
Refills: 0 | Status: DISCONTINUED | OUTPATIENT
Start: 2024-11-02 | End: 2024-11-03

## 2024-11-01 RX ORDER — POTASSIUM CHLORIDE 10 MEQ
40 TABLET, EXTENDED RELEASE ORAL ONCE
Refills: 0 | Status: COMPLETED | OUTPATIENT
Start: 2024-11-01 | End: 2024-11-01

## 2024-11-01 RX ORDER — POTASSIUM CHLORIDE 10 MEQ
10 TABLET, EXTENDED RELEASE ORAL
Refills: 0 | Status: COMPLETED | OUTPATIENT
Start: 2024-11-01 | End: 2024-11-01

## 2024-11-01 RX ORDER — LOSARTAN POTASSIUM 25 MG/1
25 TABLET ORAL ONCE
Refills: 0 | Status: COMPLETED | OUTPATIENT
Start: 2024-11-01 | End: 2024-11-01

## 2024-11-01 RX ORDER — POTASSIUM CHLORIDE 10 MEQ
10 TABLET, EXTENDED RELEASE ORAL
Refills: 0 | Status: DISCONTINUED | OUTPATIENT
Start: 2024-11-01 | End: 2024-11-01

## 2024-11-01 RX ADMIN — Medication 40 MILLIEQUIVALENT(S): at 10:30

## 2024-11-01 RX ADMIN — Medication 81 MILLIGRAM(S): at 11:39

## 2024-11-01 RX ADMIN — Medication 650 MILLIGRAM(S): at 01:30

## 2024-11-01 RX ADMIN — Medication 650 MILLIGRAM(S): at 00:56

## 2024-11-01 RX ADMIN — HEPARIN SODIUM 5000 UNIT(S): 10000 INJECTION INTRAVENOUS; SUBCUTANEOUS at 14:20

## 2024-11-01 RX ADMIN — LOSARTAN POTASSIUM 25 MILLIGRAM(S): 25 TABLET ORAL at 18:55

## 2024-11-01 RX ADMIN — Medication 100 MILLIEQUIVALENT(S): at 13:44

## 2024-11-01 RX ADMIN — Medication 80 MILLIGRAM(S): at 11:58

## 2024-11-01 RX ADMIN — Medication 100 MILLIEQUIVALENT(S): at 14:21

## 2024-11-01 RX ADMIN — HEPARIN SODIUM 5000 UNIT(S): 10000 INJECTION INTRAVENOUS; SUBCUTANEOUS at 22:34

## 2024-11-01 RX ADMIN — HEPARIN SODIUM 5000 UNIT(S): 10000 INJECTION INTRAVENOUS; SUBCUTANEOUS at 06:15

## 2024-11-01 RX ADMIN — CLOPIDOGREL 75 MILLIGRAM(S): 75 TABLET ORAL at 11:39

## 2024-11-01 RX ADMIN — Medication 100 MILLIEQUIVALENT(S): at 10:29

## 2024-11-01 NOTE — CONSULT NOTE ADULT - ASSESSMENT
53 year old M, creole speaking, right-handed man who presents for sudden onset of dizziness.  He has had previous ischemic stroke right basal ganglia, left PCA infarct noted on noncontrast head CT.  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 Neurosurgery consulted for 2 mm inferiorly directed outpouching arising from the left supraclinoid ICA suggesting an infundibulum versus aneurysm.

## 2024-11-01 NOTE — CHART NOTE - NSCHARTNOTEFT_GEN_A_CORE
53-year-old Creole speaking right-handed man who presents for sudden onset of dizziness.  He has had previous ischemic stroke right basal ganglia, left PCA infarct noted on noncontrast head CT.  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       < from: MR Angio Head No Cont (11.01.24 @ 08:42) >      Brain MRI:  1.  No acute infarct.  2.  Multiple foci of susceptibility artifact in the bilateral cerebral   hemispheres, basal ganglia, brainstem and cerebellum suggesting chronic   hemorrhages.    Brain MRA:  1.  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   Patent right vertebral and basilar arteries.  2.  Diminutive caliber of the left P1 segment, with no flow-related   signal in the left PCA more distally suggesting occlusion or high-grade   stenosis.  3.  A 2 mm inferiorly directed outpouching arising from the left   supraclinoid ICA suggesting an infundibulum versus aneurysm (1003:26).    Neck MRA: No hemodynamically significant stenosis of the bilateral   cervical ICAs by NASCET criteria.    < end of copied text >    Impression: Vertigo with nystagmus due to peripheral vertigo    Recommendation:  [] Outpatient neurosurgery follow up to monitor aneurysm    [] Vestibular rehab  [] LDL 85, A1c 5.6  [] Lipitor 80 mg HS titrate LDL less than 70  [] meclizine 12.5 Q 8 prn   [] DVT ppx  [] Follow up with neurology at Patient can follow up with general neurology at [address] [place] [address]. Please instruct the patient to call [phone] to schedule this appointment or Follow up with [facility], located in [address] [place] [address] at [address] [place] [address]. Patient/family can call [phone] to schedule this appointment.     Discussed with Dr. Jasmine. 53-year-old Creole speaking right-handed man who presents for sudden onset of dizziness.  He has had previous ischemic stroke right basal ganglia, left PCA infarct noted on noncontrast head CT.  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       < from: MR Angio Head No Cont (11.01.24 @ 08:42) >      Brain MRI:  1.  No acute infarct.  2.  Multiple foci of susceptibility artifact in the bilateral cerebral   hemispheres, basal ganglia, brainstem and cerebellum suggesting chronic   hemorrhages.    Brain MRA:  1.  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   Patent right vertebral and basilar arteries.  2.  Diminutive caliber of the left P1 segment, with no flow-related   signal in the left PCA more distally suggesting occlusion or high-grade   stenosis.  3.  A 2 mm inferiorly directed outpouching arising from the left   supraclinoid ICA suggesting an infundibulum versus aneurysm (1003:26).    Neck MRA: No hemodynamically significant stenosis of the bilateral   cervical ICAs by NASCET criteria.    < end of copied text >    Impression: Vertigo with nystagmus due to peripheral vertigo    Recommendation:  [] Continue ASA 81 mg daily   [] Outpatient neurosurgery follow up to monitor aneurysm    [] Vestibular rehab  [] LDL 85, A1c 5.6  [] Lipitor 80 mg HS titrate LDL less than 70  [] meclizine 12.5 Q 8 prn   [] DVT ppx  [] Follow up with neurology at Patient can follow up with general neurology at [address] [place] [address]. Please instruct the patient to call [phone] to schedule this appointment or Follow up with [facility], located in [address] [place] [address] at [address] [place] [address]. Patient/family can call [phone] to schedule this appointment.     Discussed with Dr. Jasmine.

## 2024-11-01 NOTE — PROGRESS NOTE ADULT - TIME BILLING
Time spent on review of vitals, physical exam, documentation, and discussion of plan of care with patient, patient's wife on the phone, and healthcare team

## 2024-11-01 NOTE — CONSULT NOTE ADULT - SUBJECTIVE AND OBJECTIVE BOX
NEUROSURGERY CONSULT    HPI: 53 year old PMH HTN, CVA (?residual gait abnormality) on aspirin presents c/o nausea, NBNB vomiting, and dizziness 30 minutes after eating African food last night at 6pm since resolved. In the ED, patient was noted to have marked left beating nystagmus and code stroke was called. Patient denies focal weakness, HA, or visual changes. CTH without acute bleed or infarct shows periventricular and subcortical white matter hypodensities, consistent with microvascular type changes. Left occipital lobe encephalomalacia. CTP diagnostic quality w/   35 mL in the left occipital lobe/PCA territory, no core infarct. CTA w/ mild athero b/l ICA in neck and siphons, no stenosis. Left ICA supraclinoid mod stenosis. Prox left PCA severe stenosis vs partial occlusion. Left vert hypoplastic. Question if left SCA also involved. In the ED neurology evaluated and patient was loaded with ASA and plavix.    RADIOLOGY:   Brain MRI/ MRA w/wo 11/1/24  IMPRESSION:    Brain MRI:  1.  No acute infarct.  2.  Multiple foci of susceptibility artifact in the bilateral cerebral   hemispheres, basal ganglia, brainstem and cerebellum suggesting chronic   hemorrhages.    Brain MRA:  1.  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   Patent right vertebral and basilar arteries.  2.  Diminutive caliber of the left P1 segment, with no flow-related   signal in the left PCA more distally suggesting occlusion or high-grade   stenosis.  3.  A 2 mm inferiorly directed outpouching arising from the left   supraclinoid ICA suggesting an infundibulum versus aneurysm (1003:26).    Neck MRA: No hemodynamically significant stenosis of the bilateral   cervical ICAs by NASCET criteria.    --- End of Report ---            ZACK LOBO MD; Attending Radiologist  This document has been electronically signed. Nov 1 2024  9:34AM      MEDS:  acetaminophen     Tablet .. 650 milliGRAM(s) Oral every 6 hours PRN  aspirin  chewable 81 milliGRAM(s) Oral daily  atorvastatin 80 milliGRAM(s) Oral every 24 hours  heparin   Injectable 5000 Unit(s) SubCutaneous every 8 hours  influenza   Vaccine 0.5 milliLiter(s) IntraMuscular once  ondansetron Injectable 4 milliGRAM(s) IV Push every 6 hours PRN      Vital Signs Last 24 Hrs  T(C): 36.8 (01 Nov 2024 12:49), Max: 36.8 (01 Nov 2024 01:36)  T(F): 98.2 (01 Nov 2024 12:49), Max: 98.3 (01 Nov 2024 01:36)  HR: 76 (01 Nov 2024 12:49) (67 - 78)  BP: 145/121 (01 Nov 2024 12:49) (145/121 - 157/88)  BP(mean): --  RR: 18 (01 Nov 2024 12:49) (17 - 18)  SpO2: 100% (01 Nov 2024 12:49) (98% - 100%)    Parameters below as of 01 Nov 2024 12:49  Patient On (Oxygen Delivery Method): room air        LABS:                        13.7   7.10  )-----------( 219      ( 01 Nov 2024 04:40 )             42.1     11-01    136  |  96[L]  |  11  ----------------------------<  96  2.8[LL]   |  25  |  1.09    Ca    9.4      01 Nov 2024 04:40  Phos  2.4     11-01  Mg     2.10     11-01    TPro  8.1  /  Alb  4.5  /  TBili  0.4  /  DBili  x   /  AST  23  /  ALT  18  /  AlkPhos  91  10-31    PT/INR - ( 31 Oct 2024 05:09 )   PT: 13.2 sec;   INR: 1.14 ratio         PTT - ( 31 Oct 2024 05:09 )  PTT:27.1 sec      PHYSICAL EXAM:  speaks creole*  AOx3, appropriate, follows commands  PERRL, EOMI, face symmetrical   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   ODELL x 4 with good strength   Sensation intact to light touch   No pronator drift

## 2024-11-01 NOTE — SWALLOW VFSS/MBS ASSESSMENT ADULT - ADDITIONAL RECOMMENDATIONS
This department to follow up as schedule permits to assess for diet tolerance/advancement/swallow therapy. Patient may benefit swallow therapy pending discharge planning (e.g. Homecare vs. Rehab vs. [facility] [phone]).     Monitor for any evolving neurological changes that may impact patient's PO intake. Medical team further advised to reconsult if there is a change in medical status and/or observed change in patient's tolerance of recommended PO diet.

## 2024-11-01 NOTE — SWALLOW VFSS/MBS ASSESSMENT ADULT - DIAGNOSTIC IMPRESSIONS
1. Mild Oral Stage for puree, regular solids, mildly thick liquids, and thin liquids characterized by adequate oral containment, adequate bolus manipulation, adequate mastication for regular solids, adequate anterior to posterior transfer with piecemeal transfer/deglutition (2 swallows) for regular solids, premature spillage to the hypopharynx (vallecular) due to reduced tongue to palate seal greater for thin liquids, and adequate oral clearance.   2. Mild Pharyngeal Stage for puree, regular solids, mildly thick liquids, and thin liquids characterized by delayed initiation of the pharyngeal swallow (Bolus head at the vallecular greater for thin liquid), adequate laryngeal elevation, adequate laryngeal vestibule closure, adequate base of tongue retraction, adequate epiglottic deflection, and adequate pharyngeal contractility. There is adequate pharyngeal clearance. There is No Aspiration before, during, or after the swallow for puree, regular solids, mildly thick liquids, and thin liquids.

## 2024-11-01 NOTE — CONSULT NOTE ADULT - PROBLEM SELECTOR RECOMMENDATION 9
- no acute neurosurgical intervention warranted at this time  - recommend outpatient follow up with Dr. Sanches in 1-2 weeks     [id number]    Case discussed with attending neurosurgeon Dr. Sanches

## 2024-11-01 NOTE — PHARMACOTHERAPY INTERVENTION NOTE - COMMENTS
Discharge medications were reviewed with the patient via Creole  (ID# [id number]). Current medication schedule was discussed in detail including: medication name, indication, dose, administration times, treatment duration of DAPT, side effects, drug interactions, and special instructions. All questions and concerns were answered and addressed. Patient verbalized understanding and was provided with educational drug cards.     Ev Menjivar, PharmD, [facility]  Clinical Pharmacy Specialist  Spectra [phone]

## 2024-11-01 NOTE — SWALLOW VFSS/MBS ASSESSMENT ADULT - RECOMMENDED CONSISTENCY
1. Regular Solids with Thin Liquids  2. Swallowing Guideline: Seated Upright during Mealtimes; Slow Pacing; One/Bite Sip at a Time   3. Maintain Adequate Oral Hygiene   4. Aspiration and Reflux Precautions

## 2024-11-01 NOTE — SWALLOW VFSS/MBS ASSESSMENT ADULT - COMMENTS
H&P Adult 10/31: "53 year old PMH HTN, CVA presents c/o nausea, NBNB vomiting, and dizziness 30 minutes after eating African food last night at 6pm since resolved. In the ED, patient was noted to have marked left beating nystagmus and code stroke was called. CTH without acute bleed or infarct. CTP diagnostic quality w/   35 mL in the left occipital lobe/PCA territory, no core infarct. CTA w/ left ICA supraclinoid mod stenosis, prox left PCA severe stenosis vs partial occlusion. Consider vertigo and right homonymous hemianopia 2/2 left posterior dysfunction due to occlusions (ESUS) vs intracranial atherosclerotic disease."    Neurology Note 10/31: "Impression: -Vertigo and right homonymous hemianopia localizing to left posterior dysfunction due to ICAD vs occlusions (ESUS)"    Of note, patient seen for clinical swallow evaluation on 10/31 with recommendations of Regular Solids with Mildly Thick Liquids and a Cinesophagram (see note for details)     Patient received in Radiology Suite this AM for a Cinesophagram. Patient is [place] Creole speaking, Language Line utilized (ID# [id number]) for translation. Patient transferred to a specialized seating unit with lateral view projection.

## 2024-11-01 NOTE — PROVIDER CONTACT NOTE (OTHER) - ASSESSMENT
Patient's BP is 154/110. Patient is in a permissive hypertension state. Patient is asymptomatic.
Patient asymptomatic, denies chest pain sob headache or dizziness

## 2024-11-02 LAB
ANION GAP SERPL CALC-SCNC: 16 MMOL/L — HIGH (ref 7–14)
BUN SERPL-MCNC: 18 MG/DL — SIGNIFICANT CHANGE UP (ref 7–23)
CALCIUM SERPL-MCNC: 9.8 MG/DL — SIGNIFICANT CHANGE UP (ref 8.4–10.5)
CHLORIDE SERPL-SCNC: 96 MMOL/L — LOW (ref 98–107)
CO2 SERPL-SCNC: 25 MMOL/L — SIGNIFICANT CHANGE UP (ref 22–31)
CREAT SERPL-MCNC: 1.24 MG/DL — SIGNIFICANT CHANGE UP (ref 0.5–1.3)
EGFR: 69 ML/MIN/1.73M2 — SIGNIFICANT CHANGE UP
GLUCOSE SERPL-MCNC: 70 MG/DL — SIGNIFICANT CHANGE UP (ref 70–99)
HCT VFR BLD CALC: 45 % — SIGNIFICANT CHANGE UP (ref 39–50)
HGB BLD-MCNC: 14.1 G/DL — SIGNIFICANT CHANGE UP (ref 13–17)
MAGNESIUM SERPL-MCNC: 1.9 MG/DL — SIGNIFICANT CHANGE UP (ref 1.6–2.6)
MCHC RBC-ENTMCNC: 21.8 PG — LOW (ref 27–34)
MCHC RBC-ENTMCNC: 31.3 G/DL — LOW (ref 32–36)
MCV RBC AUTO: 69.6 FL — LOW (ref 80–100)
NRBC # BLD: 0 /100 WBCS — SIGNIFICANT CHANGE UP (ref 0–0)
NRBC # FLD: 0 K/UL — SIGNIFICANT CHANGE UP (ref 0–0)
PHOSPHATE SERPL-MCNC: 2.9 MG/DL — SIGNIFICANT CHANGE UP (ref 2.5–4.5)
PLATELET # BLD AUTO: 212 K/UL — SIGNIFICANT CHANGE UP (ref 150–400)
POTASSIUM SERPL-MCNC: 3.2 MMOL/L — LOW (ref 3.5–5.3)
POTASSIUM SERPL-SCNC: 3.2 MMOL/L — LOW (ref 3.5–5.3)
RBC # BLD: 6.47 M/UL — HIGH (ref 4.2–5.8)
RBC # FLD: 17.6 % — HIGH (ref 10.3–14.5)
SODIUM SERPL-SCNC: 137 MMOL/L — SIGNIFICANT CHANGE UP (ref 135–145)
WBC # BLD: 6.44 K/UL — SIGNIFICANT CHANGE UP (ref 3.8–10.5)
WBC # FLD AUTO: 6.44 K/UL — SIGNIFICANT CHANGE UP (ref 3.8–10.5)

## 2024-11-02 PROCEDURE — 99232 SBSQ HOSP IP/OBS MODERATE 35: CPT

## 2024-11-02 RX ORDER — POTASSIUM CHLORIDE 10 MEQ
40 TABLET, EXTENDED RELEASE ORAL ONCE
Refills: 0 | Status: COMPLETED | OUTPATIENT
Start: 2024-11-02 | End: 2024-11-02

## 2024-11-02 RX ORDER — AMLODIPINE BESYLATE 10 MG
10 TABLET ORAL DAILY
Refills: 0 | Status: DISCONTINUED | OUTPATIENT
Start: 2024-11-02 | End: 2024-11-04

## 2024-11-02 RX ADMIN — Medication 10 MILLIGRAM(S): at 09:10

## 2024-11-02 RX ADMIN — Medication 40 MILLIEQUIVALENT(S): at 08:41

## 2024-11-02 RX ADMIN — Medication 81 MILLIGRAM(S): at 11:15

## 2024-11-02 RX ADMIN — Medication 650 MILLIGRAM(S): at 08:36

## 2024-11-02 RX ADMIN — LOSARTAN POTASSIUM 25 MILLIGRAM(S): 25 TABLET ORAL at 05:23

## 2024-11-02 RX ADMIN — Medication 650 MILLIGRAM(S): at 09:20

## 2024-11-02 RX ADMIN — HEPARIN SODIUM 5000 UNIT(S): 10000 INJECTION INTRAVENOUS; SUBCUTANEOUS at 14:09

## 2024-11-02 RX ADMIN — Medication 80 MILLIGRAM(S): at 11:16

## 2024-11-02 RX ADMIN — HEPARIN SODIUM 5000 UNIT(S): 10000 INJECTION INTRAVENOUS; SUBCUTANEOUS at 05:23

## 2024-11-03 LAB
ANION GAP SERPL CALC-SCNC: 13 MMOL/L — SIGNIFICANT CHANGE UP (ref 7–14)
BUN SERPL-MCNC: 18 MG/DL — SIGNIFICANT CHANGE UP (ref 7–23)
CALCIUM SERPL-MCNC: 9.4 MG/DL — SIGNIFICANT CHANGE UP (ref 8.4–10.5)
CHLORIDE SERPL-SCNC: 98 MMOL/L — SIGNIFICANT CHANGE UP (ref 98–107)
CO2 SERPL-SCNC: 25 MMOL/L — SIGNIFICANT CHANGE UP (ref 22–31)
CREAT SERPL-MCNC: 1.23 MG/DL — SIGNIFICANT CHANGE UP (ref 0.5–1.3)
EGFR: 69 ML/MIN/1.73M2 — SIGNIFICANT CHANGE UP
GLUCOSE SERPL-MCNC: 84 MG/DL — SIGNIFICANT CHANGE UP (ref 70–99)
HCT VFR BLD CALC: 44.3 % — SIGNIFICANT CHANGE UP (ref 39–50)
HGB BLD-MCNC: 14.2 G/DL — SIGNIFICANT CHANGE UP (ref 13–17)
MAGNESIUM SERPL-MCNC: 1.8 MG/DL — SIGNIFICANT CHANGE UP (ref 1.6–2.6)
MCHC RBC-ENTMCNC: 21.9 PG — LOW (ref 27–34)
MCHC RBC-ENTMCNC: 32.1 G/DL — SIGNIFICANT CHANGE UP (ref 32–36)
MCV RBC AUTO: 68.5 FL — LOW (ref 80–100)
NRBC # BLD: 0 /100 WBCS — SIGNIFICANT CHANGE UP (ref 0–0)
NRBC # FLD: 0 K/UL — SIGNIFICANT CHANGE UP (ref 0–0)
PHOSPHATE SERPL-MCNC: 2.8 MG/DL — SIGNIFICANT CHANGE UP (ref 2.5–4.5)
PLATELET # BLD AUTO: 221 K/UL — SIGNIFICANT CHANGE UP (ref 150–400)
POTASSIUM SERPL-MCNC: 3.2 MMOL/L — LOW (ref 3.5–5.3)
POTASSIUM SERPL-SCNC: 3.2 MMOL/L — LOW (ref 3.5–5.3)
RBC # BLD: 6.47 M/UL — HIGH (ref 4.2–5.8)
RBC # FLD: 16.3 % — HIGH (ref 10.3–14.5)
SODIUM SERPL-SCNC: 136 MMOL/L — SIGNIFICANT CHANGE UP (ref 135–145)
WBC # BLD: 7.49 K/UL — SIGNIFICANT CHANGE UP (ref 3.8–10.5)
WBC # FLD AUTO: 7.49 K/UL — SIGNIFICANT CHANGE UP (ref 3.8–10.5)

## 2024-11-03 PROCEDURE — 99232 SBSQ HOSP IP/OBS MODERATE 35: CPT

## 2024-11-03 RX ORDER — LOSARTAN POTASSIUM 25 MG/1
50 TABLET ORAL DAILY
Refills: 0 | Status: DISCONTINUED | OUTPATIENT
Start: 2024-11-04 | End: 2024-11-04

## 2024-11-03 RX ORDER — LOSARTAN POTASSIUM 25 MG/1
25 TABLET ORAL ONCE
Refills: 0 | Status: COMPLETED | OUTPATIENT
Start: 2024-11-03 | End: 2024-11-03

## 2024-11-03 RX ORDER — POTASSIUM CHLORIDE 10 MEQ
40 TABLET, EXTENDED RELEASE ORAL ONCE
Refills: 0 | Status: COMPLETED | OUTPATIENT
Start: 2024-11-03 | End: 2024-11-03

## 2024-11-03 RX ADMIN — HEPARIN SODIUM 5000 UNIT(S): 10000 INJECTION INTRAVENOUS; SUBCUTANEOUS at 06:29

## 2024-11-03 RX ADMIN — LOSARTAN POTASSIUM 25 MILLIGRAM(S): 25 TABLET ORAL at 08:46

## 2024-11-03 RX ADMIN — HEPARIN SODIUM 5000 UNIT(S): 10000 INJECTION INTRAVENOUS; SUBCUTANEOUS at 14:06

## 2024-11-03 RX ADMIN — Medication 40 MILLIEQUIVALENT(S): at 08:40

## 2024-11-03 RX ADMIN — HEPARIN SODIUM 5000 UNIT(S): 10000 INJECTION INTRAVENOUS; SUBCUTANEOUS at 21:07

## 2024-11-03 RX ADMIN — Medication 650 MILLIGRAM(S): at 08:50

## 2024-11-03 RX ADMIN — LOSARTAN POTASSIUM 25 MILLIGRAM(S): 25 TABLET ORAL at 05:18

## 2024-11-03 RX ADMIN — Medication 10 MILLIGRAM(S): at 05:21

## 2024-11-03 RX ADMIN — Medication 80 MILLIGRAM(S): at 11:04

## 2024-11-03 RX ADMIN — Medication 650 MILLIGRAM(S): at 09:50

## 2024-11-03 RX ADMIN — Medication 81 MILLIGRAM(S): at 11:04

## 2024-11-03 RX ADMIN — HEPARIN SODIUM 5000 UNIT(S): 10000 INJECTION INTRAVENOUS; SUBCUTANEOUS at 05:21

## 2024-11-03 NOTE — PROGRESS NOTE ADULT - PROBLEM SELECTOR PLAN 2
s/p 3 episodes prior to admission, NBNB, since resolved  Zofran PRN vomiting

## 2024-11-03 NOTE — PROGRESS NOTE ADULT - SUBJECTIVE AND OBJECTIVE BOX
PROGRESS NOTE:   Authored by Shanika Heath Ma, MD  Available on MS Teams; Pager [phone]    Patient is a 53y old  Male who presents with a chief complaint of Vertigo / not feeling well (31 Oct 2024 04:36)    SUBJECTIVE / OVERNIGHT EVENTS: NAEON. Pt seen and examined with wife on the phone providing translation. Pt states his n/v has resolved, has been tolerating PO without further episodes, last vomited yesterday afternoon. He still c/o weakness with standing, orthostatics negative. He denies any other symptoms.    All other review of systems is negative unless indicated above.    MEDICATIONS  (STANDING):  aspirin  chewable 81 milliGRAM(s) Oral daily  atorvastatin 80 milliGRAM(s) Oral every 24 hours  clopidogrel Tablet 75 milliGRAM(s) Oral daily  dextrose 5% + sodium chloride 0.9%. 1000 milliLiter(s) (75 mL/Hr) IV Continuous <Continuous>  heparin   Injectable 5000 Unit(s) SubCutaneous every 8 hours  influenza   Vaccine 0.5 milliLiter(s) IntraMuscular once    MEDICATIONS  (PRN):  acetaminophen     Tablet .. 650 milliGRAM(s) Oral every 6 hours PRN Temp greater or equal to 38C (100.4F), Mild Pain (1 - 3), Moderate Pain (4 - 6)  ondansetron Injectable 4 milliGRAM(s) IV Push every 6 hours PRN Nausea and/or Vomiting      CAPILLARY BLOOD GLUCOSE        I&O's Summary      PHYSICAL EXAM:  Vital Signs Last 24 Hrs  T(C): 36.8 (01 Nov 2024 12:49), Max: 36.8 (01 Nov 2024 01:36)  T(F): 98.2 (01 Nov 2024 12:49), Max: 98.3 (01 Nov 2024 01:36)  HR: 76 (01 Nov 2024 12:49) (67 - 80)  BP: 145/121 (01 Nov 2024 12:49) (145/121 - 157/88)  BP(mean): --  RR: 18 (01 Nov 2024 12:49) (17 - 18)  SpO2: 100% (01 Nov 2024 12:49) (98% - 100%)    Parameters below as of 01 Nov 2024 12:49  Patient On (Oxygen Delivery Method): room air        GENERAL: No acute distress  HEAD:  Normocephalic, Atraumatic  EYES: EOMI, PERRL  NECK: Supple  CHEST/LUNG: CTAB, No wheezes, rales, or rhonchi  HEART: Regular rate and rhythm; No murmurs, rubs, or gallops  ABDOMEN: Soft, non-tender, non-distended  EXTREMITIES: No clubbing, cyanosis, or edema  NEUROLOGY: A&O x 4. 5/5 str in all 4 extremities. Sensation intact b/l. FNF and heel to shin WNL bilaterally. Partial L homonymous hemianopia, rest of CN appear intact and WNL  SKIN: No rashes or lesions    LABS:                        13.7   7.10  )-----------( 219      ( 01 Nov 2024 04:40 )             42.1     11-01    136  |  96[L]  |  11  ----------------------------<  96  2.8[LL]   |  25  |  1.09    Ca    9.4      01 Nov 2024 04:40  Phos  2.4     11-01  Mg     2.10     11-01    TPro  8.1  /  Alb  4.5  /  TBili  0.4  /  DBili  x   /  AST  23  /  ALT  18  /  AlkPhos  91  10-31    PT/INR - ( 31 Oct 2024 05:09 )   PT: 13.2 sec;   INR: 1.14 ratio         PTT - ( 31 Oct 2024 05:09 )  PTT:27.1 sec      Urinalysis Basic - ( 01 Nov 2024 04:40 )    Color: x / Appearance: x / SG: x / pH: x  Gluc: 96 mg/dL / Ketone: x  / Bili: x / Urobili: x   Blood: x / Protein: x / Nitrite: x   Leuk Esterase: x / RBC: x / WBC x   Sq Epi: x / Non Sq Epi: x / Bacteria: x            RADIOLOGY & ADDITIONAL TESTS: Reviewed
Patient is a 53y old  Male who presents with a chief complaint of dizziness, n/v (01 Nov 2024 16:06)      INTERVAL HPI/OVERNIGHT EVENTS: IMELDA overnight. This morning, pt endorsing trouble walking due to balance issues. On tele, in SR with rates in 70s.      REVIEW OF SYSTEMS:    CONSTITUTIONAL: No weakness, fevers or chills  EYES/ENT: No visual changes;  No vertigo or throat pain   NECK: No pain or stiffness  RESPIRATORY: No cough, wheezing, hemoptysis; No shortness of breath  CARDIOVASCULAR: No chest pain or palpitations  GASTROINTESTINAL: No abdominal or epigastric pain. No nausea, vomiting, or hematemesis; No diarrhea or constipation. No melena or hematochezia.  GENITOURINARY: No dysuria, frequency or hematuria  NEUROLOGICAL: No numbness or weakness  All other review of systems is negative unless indicated above.    FAMILY HISTORY:    T(C): 36.7 (11-02-24 @ 05:20), Max: 37 (11-01-24 @ 17:24)  HR: 75 (11-02-24 @ 05:20) (71 - 83)  BP: 141/97 (11-02-24 @ 05:20) (129/86 - 154/112)  RR: 18 (11-02-24 @ 05:20) (16 - 18)  SpO2: 98% (11-02-24 @ 05:20) (97% - 100%)  Wt(kg): --Vital Signs Last 24 Hrs  T(C): 36.7 (02 Nov 2024 05:20), Max: 37 (01 Nov 2024 17:24)  T(F): 98.1 (02 Nov 2024 05:20), Max: 98.6 (01 Nov 2024 17:24)  HR: 75 (02 Nov 2024 05:20) (71 - 83)  BP: 141/97 (02 Nov 2024 05:20) (129/86 - 154/112)  BP(mean): --  RR: 18 (02 Nov 2024 05:20) (16 - 18)  SpO2: 98% (02 Nov 2024 05:20) (97% - 100%)    Parameters below as of 02 Nov 2024 05:20  Patient On (Oxygen Delivery Method): room air        PHYSICAL EXAM:  GENERAL: No acute distress  HEAD:  Normocephalic, Atraumatic  EYES: EOMI, PERRL  NECK: Supple  CHEST/LUNG: CTAB, No wheezes, rales, or rhonchi  HEART: Regular rate and rhythm; No murmurs, rubs, or gallops  ABDOMEN: Soft, non-tender, non-distended  EXTREMITIES: No clubbing, cyanosis, or edema  NEUROLOGY: A&O x 4. 5/5 str in all 4 extremities. Sensation intact b/l. FNF and heel to shin WNL bilaterally. Partial L homonymous hemianopia, rest of CN appear intact and WNL  SKIN: No rashes or lesions    Consultant(s) Notes Reviewed:  [x ] YES  [ ] NO  Care Discussed with Consultants/Other Providers [ x] YES  [ ] NO    LABS:                        14.1   6.44  )-----------( 212      ( 02 Nov 2024 04:28 )             45.0     02 Nov 2024 04:28    137    |  96     |  18     ----------------------------<  70     3.2     |  25     |  1.24     Ca    9.8        02 Nov 2024 04:28  Phos  2.9       02 Nov 2024 04:28  Mg     1.90      02 Nov 2024 04:28        CAPILLARY BLOOD GLUCOSE        BLOOD CULTURE      RADIOLOGY & ADDITIONAL TESTS:    Imaging Personally Reviewed:  [ ] YES  [ ] NO  acetaminophen     Tablet .. 650 milliGRAM(s) Oral every 6 hours PRN  aspirin  chewable 81 milliGRAM(s) Oral daily  atorvastatin 80 milliGRAM(s) Oral every 24 hours  heparin   Injectable 5000 Unit(s) SubCutaneous every 8 hours  influenza   Vaccine 0.5 milliLiter(s) IntraMuscular once  losartan 25 milliGRAM(s) Oral daily  ondansetron Injectable 4 milliGRAM(s) IV Push every 6 hours PRN  potassium chloride   Powder 40 milliEquivalent(s) Oral once      HEALTH ISSUES - PROBLEM Dx:  Hypertension    Hypokalemia    H/O nausea and vomiting    Need for prophylactic measure    Homonymous hemianopia, left    H/O aneurysm          
Patient is a 53y old  Male who presents with a chief complaint of dizziness, n/v (02 Nov 2024 08:29)      INTERVAL HPI/OVERNIGHT EVENTS: IMELDA overnight. This morning, no acute complaints. On tele, SR in 80s.      REVIEW OF SYSTEMS:    CONSTITUTIONAL: No weakness, fevers or chills  EYES/ENT: No visual changes;  No vertigo or throat pain   NECK: No pain or stiffness  RESPIRATORY: No cough, wheezing, hemoptysis; No shortness of breath  CARDIOVASCULAR: No chest pain or palpitations  GASTROINTESTINAL: No abdominal or epigastric pain. No nausea, vomiting, or hematemesis; No diarrhea or constipation. No melena or hematochezia.  GENITOURINARY: No dysuria, frequency or hematuria  NEUROLOGICAL: No numbness or weakness  All other review of systems is negative unless indicated above.    FAMILY HISTORY:    T(C): 36.7 (11-03-24 @ 05:00), Max: 36.7 (11-02-24 @ 16:33)  HR: 68 (11-03-24 @ 05:00) (68 - 81)  BP: 135/90 (11-03-24 @ 05:00) (134/99 - 154/100)  RR: 18 (11-03-24 @ 05:00) (17 - 18)  SpO2: 98% (11-03-24 @ 05:00) (98% - 100%)  Wt(kg): --Vital Signs Last 24 Hrs  T(C): 36.7 (03 Nov 2024 05:00), Max: 36.7 (02 Nov 2024 16:33)  T(F): 98 (03 Nov 2024 05:00), Max: 98.1 (02 Nov 2024 16:33)  HR: 68 (03 Nov 2024 05:00) (68 - 81)  BP: 135/90 (03 Nov 2024 05:00) (134/99 - 154/100)  BP(mean): 110 (02 Nov 2024 12:33) (110 - 110)  RR: 18 (03 Nov 2024 05:00) (17 - 18)  SpO2: 98% (03 Nov 2024 05:00) (98% - 100%)    Parameters below as of 03 Nov 2024 05:00  Patient On (Oxygen Delivery Method): room air        PHYSICAL EXAM:  GENERAL: No acute distress  HEAD:  Normocephalic, Atraumatic  EYES: EOMI, PERRL  NECK: Supple  CHEST/LUNG: CTAB, No wheezes, rales, or rhonchi  HEART: Regular rate and rhythm; No murmurs, rubs, or gallops  ABDOMEN: Soft, non-tender, non-distended  EXTREMITIES: No clubbing, cyanosis, or edema  NEUROLOGY: A&O x 4. 5/5 str in all 4 extremities. Sensation intact b/l. FNF and heel to shin WNL bilaterally. Partial L homonymous hemianopia, rest of CN appear intact and WNL  SKIN: No rashes or lesions    Consultant(s) Notes Reviewed:  [x ] YES  [ ] NO  Care Discussed with Consultants/Other Providers [ x] YES  [ ] NO    LABS:                        14.2   7.49  )-----------( 221      ( 03 Nov 2024 05:30 )             44.3     03 Nov 2024 05:30    136    |  98     |  18     ----------------------------<  84     3.2     |  25     |  1.23     Ca    9.4        03 Nov 2024 05:30  Phos  2.8       03 Nov 2024 05:30  Mg     1.80      03 Nov 2024 05:30        CAPILLARY BLOOD GLUCOSE        BLOOD CULTURE      RADIOLOGY & ADDITIONAL TESTS:    Imaging Personally Reviewed:  [ ] YES  [ ] NO  acetaminophen     Tablet .. 650 milliGRAM(s) Oral every 6 hours PRN  amLODIPine   Tablet 10 milliGRAM(s) Oral daily  aspirin  chewable 81 milliGRAM(s) Oral daily  atorvastatin 80 milliGRAM(s) Oral every 24 hours  heparin   Injectable 5000 Unit(s) SubCutaneous every 8 hours  influenza   Vaccine 0.5 milliLiter(s) IntraMuscular once  losartan 25 milliGRAM(s) Oral daily  ondansetron Injectable 4 milliGRAM(s) IV Push every 6 hours PRN      HEALTH ISSUES - PROBLEM Dx:  Hypertension    Hypokalemia    H/O nausea and vomiting    Need for prophylactic measure    Homonymous hemianopia, left    H/O aneurysm

## 2024-11-03 NOTE — PROGRESS NOTE ADULT - PROBLEM SELECTOR PLAN 5
DVT ppx: SQH  Diet: DASH/TLC  Dispo: pending

## 2024-11-03 NOTE — PROGRESS NOTE ADULT - PROBLEM SELECTOR PLAN 4
Resume home losartan 25mg and amlodipine 10mg as able
Increase home losartan 25mg to 50mg  Home amlodipine 10mg
Resume home losartan 25mg and amlodipine 10mg

## 2024-11-03 NOTE — PROGRESS NOTE ADULT - PROBLEM SELECTOR PLAN 3
Likely in setting of vomiting  Replete as necessary  Monitor BMP

## 2024-11-03 NOTE — CHART NOTE - NSCHARTNOTEFT_GEN_A_CORE
Notified by primary RN that patient reports hearing loss on the right ear since  Mri which was done on 11/1. Notified by primary RN that patient reports hearing loss in the right ear since 11/1  after MRI.    Seen and assessed the patient at bedside.  Patient is mostly [place] Creol speaking , even though he is able to make his needs known with limited English. ATG Access  utilized with id number [id number].     Patient is Axox4. answering to all questions appropriately and Speech is fluent with intact naming, repetition, and ability to follow commands.   PERRL, EOMI, face symmetrical   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   Uvula is midline and soft palate rises symmetrically; shoulder shrug intact; tongue protrudes in the midline with intact lateral movements  ODELL x 4 with good strength   Sensation intact to light touch   No pronator drift b/l   There is no leg drift bilaterally   Coordination: Finger-nose-finger intact without dysmetria.  Hearing-   To finger rub testing- hearing present  in both ears, but reported right side is minimally reduced compared to left since he applied ear probes during the MRI which was done on 11/1  Whisper testing- hearing is intact bilaterally with minimally reduced perception on the right side since the MRI     D/W neurology on call @[id number]. No Notified by primary RN that patient reports hearing loss in the right ear since 11/1  after MRI.    Seen and assessed the patient at bedside.  Patient is mostly [place] Creol speaking , even though he is able to make his needs known with limited English. abeo  utilized with id number [id number].     Patient is Axox4. answering to all questions appropriately and Speech is fluent with intact naming, repetition, and ability to follow commands.   PERRL, EOMI, face symmetrical   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   Uvula is midline and soft palate rises symmetrically; shoulder shrug intact; tongue protrudes in the midline with intact lateral movements  ODELL x 4 with good strength   Sensation intact to light touch   No pronator drift b/l   There is no leg drift bilaterally   Coordination: Finger-nose-finger intact without dysmetria.  Hearing-   To finger rub testing- hearing present  in both ears, but reported right side is minimally reduced compared to left since he applied ear probes during the MRI which was done on 11/1  Whisper testing- hearing is intact bilaterally with minimally reduced perception on the right side since the MRI     D/W neurology on call @[id number]. No further imaging needed per neuro.  Consider ENT c/s in Am Notified by primary RN that patient reports hearing loss in the right ear since 11/1  after MRI.    Seen and assessed the patient at bedside.  Patient is mostly [place] Creol speaking , even though he is able to make his needs known with limited English. SurgiLight  utilized with id number [id number].     Patient is Axox4. answering to all questions appropriately and Speech is fluent with intact naming, repetition, and ability to follow commands.   PERRL, EOMI, face symmetrical   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   Uvula is midline and soft palate rises symmetrically; shoulder shrug intact; tongue protrudes in the midline with intact lateral movements  ODELL x 4 with good strength   Sensation intact to light touch   No pronator drift b/l   There is no leg drift bilaterally   Coordination: Finger-nose-finger intact without dysmetria.  Hearing-   To finger rub testing- hearing present  in both ears, but reported right side is minimally reduced compared to left since he applied ear plugs during the MRI which was done on 11/1  Whisper testing- hearing is intact bilaterally with minimally reduced perception on the right side since the MRI     D/W neurology on call @[id number]. No further imaging needed per neuro.  Consider ENT c/s in Am

## 2024-11-03 NOTE — PROGRESS NOTE ADULT - PROBLEM SELECTOR PLAN 1
-Consider vertigo and partial left homonymous hemianopia 2/2 left posterior dysfunction due to occlusions (ESUS) vs intracranial atherosclerotic disease.  -CTH without acute bleed or infarct.   -CTP diagnostic quality w/ 35 mL in the left occipital lobe/PCA territory, no core infarct.   -CTA w/ left ICA supraclinoid mod stenosis, prox left PCA severe stenosis vs partial occlusion.   -In the ED patient was evaluated by neurology and evaluated by INR Fellow and attending Dr. Sanches: No role for emergent intervention in patient with concern for partially occlusive left PCA in the setting of chronic PCA stenosis; low NIHSS of 2; minimal penumbra territory on CT perfusion due to low benefit/risk ratio.   -MRI brain: No acute infarct. Multiple foci of susceptibility artifact in the bilateral cerebral hemispheres, basal ganglia, brainstem and cerebellum suggesting chronic hemorrhages.  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 Diminutive caliber of the left P1 segment, with no flow-related signal in the left PCA more distally suggesting occlusion or high-grade   stenosis. A 2 mm inferiorly directed outpouching arising from the left supraclinoid ICA suggesting an infundibulum versus aneurysm  -LDL 85, A1c 5.6  -Patient was loaded with ASA and plavix in the ED- continue aspirin 81mg daily  -Continue lipitor 80mg daily  -Evaluated by S&S- Regular with thin liquids  -Maintain neuro checks q4h, tele monitoring  -f/u TTE with bubble study  -PT recs [facility], OT recs acute rehab. PM&R consult  -Outpatient f/u with Neurology  -Appreciate NSGY recs re: L vertebral artery occlusion as n/v/dizziness symptoms may be attributed to this, and possible aneurysm seen on imaging as above

## 2024-11-04 ENCOUNTER — RESULT REVIEW (OUTPATIENT)
Age: 54
End: 2024-11-04

## 2024-11-04 ENCOUNTER — TRANSCRIPTION ENCOUNTER (OUTPATIENT)
Age: 54
End: 2024-11-04

## 2024-11-04 VITALS
DIASTOLIC BLOOD PRESSURE: 100 MMHG | HEART RATE: 78 BPM | RESPIRATION RATE: 18 BRPM | OXYGEN SATURATION: 100 % | TEMPERATURE: 98 F | SYSTOLIC BLOOD PRESSURE: 137 MMHG

## 2024-11-04 LAB
ANION GAP SERPL CALC-SCNC: 15 MMOL/L — HIGH (ref 7–14)
BUN SERPL-MCNC: 22 MG/DL — SIGNIFICANT CHANGE UP (ref 7–23)
CALCIUM SERPL-MCNC: 9.6 MG/DL — SIGNIFICANT CHANGE UP (ref 8.4–10.5)
CHLORIDE SERPL-SCNC: 100 MMOL/L — SIGNIFICANT CHANGE UP (ref 98–107)
CO2 SERPL-SCNC: 22 MMOL/L — SIGNIFICANT CHANGE UP (ref 22–31)
CREAT SERPL-MCNC: 1.33 MG/DL — HIGH (ref 0.5–1.3)
EGFR: 63 ML/MIN/1.73M2 — SIGNIFICANT CHANGE UP
GLUCOSE SERPL-MCNC: 84 MG/DL — SIGNIFICANT CHANGE UP (ref 70–99)
HCT VFR BLD CALC: 42.7 % — SIGNIFICANT CHANGE UP (ref 39–50)
HGB BLD-MCNC: 14.1 G/DL — SIGNIFICANT CHANGE UP (ref 13–17)
MAGNESIUM SERPL-MCNC: 1.9 MG/DL — SIGNIFICANT CHANGE UP (ref 1.6–2.6)
MCHC RBC-ENTMCNC: 22.2 PG — LOW (ref 27–34)
MCHC RBC-ENTMCNC: 33 G/DL — SIGNIFICANT CHANGE UP (ref 32–36)
MCV RBC AUTO: 67.2 FL — LOW (ref 80–100)
NRBC # BLD: 0 /100 WBCS — SIGNIFICANT CHANGE UP (ref 0–0)
NRBC # FLD: 0.02 K/UL — HIGH (ref 0–0)
PHOSPHATE SERPL-MCNC: 3 MG/DL — SIGNIFICANT CHANGE UP (ref 2.5–4.5)
PLATELET # BLD AUTO: 238 K/UL — SIGNIFICANT CHANGE UP (ref 150–400)
POTASSIUM SERPL-MCNC: 3.9 MMOL/L — SIGNIFICANT CHANGE UP (ref 3.5–5.3)
POTASSIUM SERPL-SCNC: 3.9 MMOL/L — SIGNIFICANT CHANGE UP (ref 3.5–5.3)
RBC # BLD: 6.35 M/UL — HIGH (ref 4.2–5.8)
RBC # FLD: 16.1 % — HIGH (ref 10.3–14.5)
SODIUM SERPL-SCNC: 137 MMOL/L — SIGNIFICANT CHANGE UP (ref 135–145)
WBC # BLD: 7.96 K/UL — SIGNIFICANT CHANGE UP (ref 3.8–10.5)
WBC # FLD AUTO: 7.96 K/UL — SIGNIFICANT CHANGE UP (ref 3.8–10.5)

## 2024-11-04 PROCEDURE — 99239 HOSP IP/OBS DSCHRG MGMT >30: CPT

## 2024-11-04 PROCEDURE — 93306 TTE W/DOPPLER COMPLETE: CPT | Mod: 26

## 2024-11-04 PROCEDURE — 99222 1ST HOSP IP/OBS MODERATE 55: CPT

## 2024-11-04 RX ORDER — LOSARTAN POTASSIUM 25 MG/1
1 TABLET ORAL
Refills: 0 | DISCHARGE

## 2024-11-04 RX ORDER — ASPIRIN/MAG CARB/ALUMINUM AMIN 325 MG
1 TABLET ORAL
Qty: 0 | Refills: 0 | DISCHARGE
Start: 2024-11-04

## 2024-11-04 RX ORDER — LOSARTAN POTASSIUM 25 MG/1
1 TABLET ORAL
Qty: 30 | Refills: 0
Start: 2024-11-04 | End: 2024-12-03

## 2024-11-04 RX ADMIN — Medication 10 MILLIGRAM(S): at 05:24

## 2024-11-04 RX ADMIN — HEPARIN SODIUM 5000 UNIT(S): 10000 INJECTION INTRAVENOUS; SUBCUTANEOUS at 13:08

## 2024-11-04 RX ADMIN — Medication 80 MILLIGRAM(S): at 13:08

## 2024-11-04 RX ADMIN — HEPARIN SODIUM 5000 UNIT(S): 10000 INJECTION INTRAVENOUS; SUBCUTANEOUS at 05:23

## 2024-11-04 RX ADMIN — Medication 81 MILLIGRAM(S): at 11:35

## 2024-11-04 RX ADMIN — LOSARTAN POTASSIUM 50 MILLIGRAM(S): 25 TABLET ORAL at 05:23

## 2024-11-04 NOTE — DISCHARGE NOTE NURSING/CASE MANAGEMENT/SOCIAL WORK - NSDCPEFALRISK_GEN_ALL_CORE
For information on Fall & Injury Prevention, visit: [email or web address] OR  [email or web address] OR  https://www.cdc.gov/steadi/patient.html

## 2024-11-04 NOTE — CONSULT NOTE ADULT - SUBJECTIVE AND OBJECTIVE BOX
Patient is a 53y old  Male who presents with a chief complaint of dizziness, n/v (03 Nov 2024 07:47)      HPI:  53 year old PMH HTN, CVA (?residual gait abnormality) on aspirin presents c/o nausea, NBNB vomiting, and dizziness 30 minutes after eating African food last night at 6pm since resolved. In the ED, patient was noted to have marked left beating nystagmus and code stroke was called. Patient denies focal weakness, HA, or visual changes. CTH without acute bleed or infarct shows periventricular and subcortical white matter hypodensities, consistent with microvascular type changes. Left occipital lobe encephalomalacia. CTP diagnostic quality w/   35 mL in the left occipital lobe/PCA territory, no core infarct. CTA w/ mild athero b/l ICA in neck and siphons, no stenosis. Left ICA supraclinoid mod stenosis. Prox left PCA severe stenosis vs partial occlusion. Left vert hypoplastic. Question if left SCA also involved. In the ED neurology evaluated and patient was loaded with ASA and plavix. (31 Oct 2024 12:28)    on mr found to have chronic b/l hemorrhages basal ganglia, brainstem, cerebellum    REVIEW OF SYSTEMS  Constitutional - No fever, No weight loss, No fatigue  HEENT - No eye pain, No visual disturbances, No difficulty hearing, No tinnitus, No vertigo, No neck pain  Respiratory - No cough, No wheezing, No shortness of breath  Cardiovascular - No chest pain, No palpitations  Gastrointestinal - No abdominal pain, No nausea, No vomiting, No diarrhea, No constipation  Genitourinary - No dysuria, No frequency, No hematuria, No incontinence  Neurological - No headaches, No memory loss, No loss of strength, No numbness, No tremors  Skin - No itching, No rashes, No lesions   Endocrine - No temperature intolerance  Musculoskeletal - No joint pain, No joint swelling, No muscle pain  Psychiatric - No depression, No anxiety    PAST MEDICAL & SURGICAL HISTORY  Stroke  HTN (hypertension)  No significant past surgical history      SOCIAL HISTORY  Smoking - Denied  EtOH - Denied   Drugs - Denied    FUNCTIONAL HISTORY  Lives with wife, (with wife's son and child's father)  in a basement apartment  15 steps to enter  Independent at baseline    CURRENT FUNCTIONAL STATUS  PT 11/1  Chart reviewed and content noted. Pt received semi-supine in bed, +telemetry, +IV, all lines intact, in NAD. Pt was able to perform sit<>stand with contact guard assist using rolling walker, required minimal assist x1 using rolling walker to ambulate 25 feet. Pt noted to have left-sided neglect throughout session while performing functional mobility. Pt returned to room, left seated at edge of bed, all lines intact in NAD. GAYLA Escalante aware         FAMILY HISTORY   No pertinent family history in first degree relatives        RECENT LABS/IMAGING  < from: MR Head w/ IV Cont (11.01.24 @ 08:42) >    ACC: [id number] EXAM:  MR BRAIN IC   ORDERED BY: FRANCY LEIJA     ACC: [id number] EXAM:  MR ANGIO NECK IC   ORDERED BY: FRANCY LEIJA     ACC: [id number] EXAM:  MR ANGIO BRAIN   ORDERED BY: FRANCY LEIJA     PROCEDURE DATE:  11/01/2024          INTERPRETATION:  CLINICAL INFORMATION: pca stenosis. LMR. Admitting Dxs:   I63.531 I63.531. Stroke.    TECHNIQUE:  Multiplanar, multisequence brain MRI was performed.  MRA head. 3-D reformats were obtained.  MRA neck. 3-D reformats were obtained.    CONTRAST:Gadavist: 8 cc administered, 2 cc discarded.    COMPARISON: CT head, CT perfusion and CTA head and neck 10/31/2024.    FINDINGS:    Brain MRI:    There is no evidence of acute infarct. Chronic left occipital infarct.   Chronic lacunar infarcts in thebilateral periventricular white matter,   right basal ganglia, left thalamus, ramila and bilateral cerebellar   hemispheres. Multiple foci of susceptibility artifact in the bilateral   cerebral hemispheres, basal ganglia, brainstem and cerebellum suggesting   chronic hemorrhages. Scattered foci of T2/FLAIR hyperintensity in the   bilateral hemispheric white matter are nonspecific but likely related to   chronic white matter microvascular ischemic disease. The ventricles are   normal in size for patient's age.    The paranasal sinuses demonstrate no signal abnormality. The mastoids   demonstrate no signal abnormality.  Bilateral orbits are within normal   limits.    Brain MRA:    There is flow-related signal in the bilateral intradural internal   carotid, anterior cerebral and middle cerebral arteries.    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 Patent   right vertebral and basilar arteries. Diminutive caliber of the left P1   segment, with no flow-related signal in the left PCA more distally   suggesting occlusion or high-grade stenosis. Hypoplastic right P1 segment.    A 2 mm inferiorly directed outpouching arising from the left supraclinoid   ICA suggesting an infundibulum versus aneurysm (1003:26). Tiny aneurysms   can be beyond the resolution of MRA technique. No evidence of   arteriovenous malformation.    Neck MRA:    There is flow-related signal in the bilateral common carotid and cervical   internal carotid arteries. No hemodynamically significant stenosis of the   bilateral cervical ICAs by NASCET criteria.    There is flow-related signal in the bilateral vertebral arteries.   Dominant right and hypoplastic left vertebral arteries.      IMPRESSION:    Brain MRI:  1.  No acute infarct.  2.  Multiple foci of susceptibility artifact in the bilateral cerebral   hemispheres, basal ganglia, brainstem and cerebellum suggesting chronic   hemorrhages.    Brain MRA:  1.  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   Patent right vertebral and basilar arteries.  2.  Diminutive caliber of the left P1 segment, with no flow-related   signal in the left PCA more distally suggesting occlusion or high-grade   stenosis.  3.  A 2 mm inferiorly directed outpouching arising from the left   supraclinoid ICA suggesting an infundibulum versus aneurysm (1003:26).    Neck MRA: No hemodynamically significant stenosis of the bilateral   cervical ICAs by NASCET criteria.    --- End of Report ---            ZACK LOBO MD; Attending Radiologist  This document has been electronically signed. Nov 1 2024  9:34AM    < end of copied text >    CBC Full  -  ( 04 Nov 2024 04:26 )  WBC Count : 7.96 K/uL  RBC Count : 6.35 M/uL  Hemoglobin : 14.1 g/dL  Hematocrit : 42.7 %  Platelet Count - Automated : 238 K/uL  Mean Cell Volume : 67.2 fL  Mean Cell Hemoglobin : 22.2 pg  Mean Cell Hemoglobin Concentration : 33.0 g/dL  Auto Neutrophil # : x  Auto Lymphocyte # : x  Auto Monocyte # : x  Auto Eosinophil # : x  Auto Basophil # : x  Auto Neutrophil % : x  Auto Lymphocyte % : x  Auto Monocyte % : x  Auto Eosinophil % : x  Auto Basophil % : x    11-04    137  |  100  |  22  ----------------------------<  84  3.9   |  22  |  1.33[H]    Ca    9.6      04 Nov 2024 04:26  Phos  3.0     11-04  Mg     1.90     11-04      Urinalysis Basic - ( 04 Nov 2024 04:26 )    Color: x / Appearance: x / SG: x / pH: x  Gluc: 84 mg/dL / Ketone: x  / Bili: x / Urobili: x   Blood: x / Protein: x / Nitrite: x   Leuk Esterase: x / RBC: x / WBC x   Sq Epi: x / Non Sq Epi: x / Bacteria: x        VITALS  T(C): 37.1 (11-04-24 @ 08:43), Max: 37.1 (11-04-24 @ 08:43)  HR: 69 (11-04-24 @ 08:43) (61 - 75)  BP: 143/98 (11-04-24 @ 08:43) (130/97 - 155/97)  RR: 17 (11-04-24 @ 08:43) (17 - 18)  SpO2: 100% (11-04-24 @ 08:43) (100% - 100%)  Wt(kg): --    ALLERGIES  No Known Allergies      MEDICATIONS   acetaminophen     Tablet .. 650 milliGRAM(s) Oral every 6 hours PRN  amLODIPine   Tablet 10 milliGRAM(s) Oral daily  aspirin  chewable 81 milliGRAM(s) Oral daily  atorvastatin 80 milliGRAM(s) Oral every 24 hours  heparin   Injectable 5000 Unit(s) SubCutaneous every 8 hours  influenza   Vaccine 0.5 milliLiter(s) IntraMuscular once  losartan 50 milliGRAM(s) Oral daily  ondansetron Injectable 4 milliGRAM(s) IV Push every 6 hours PRN      ----------------------------------------------------------------------------------------  PHYSICAL EXAM  Constitutional - NAD, Comfortable  HEENT - NCAT, EOMI  Neck - Supple, No limited ROM  Chest - CTA bilaterally, No wheeze, No rhonchi, No crackles  Cardiovascular - RRR, S1S2, No murmurs  Abdomen - BS+, Soft, NTND  Extremities - No C/C/E, No calf tenderness   Neurologic Exam -                    Cognitive - Awake, Alert, AAO to self, place, date, year, situation     Communication - Fluent, No dysarthria     Cranial Nerves - CN 2-12 intact     Motor - No focal deficits                    LEFT    UE - ShAB 5/5, EF 5/5, EE 5/5, WE 5/5,  5/5                    RIGHT UE - ShAB 5/5, EF 5/5, EE 5/5, WE 5/5,  5/5                    LEFT    LE - HF 5/5, KE 5/5, DF 5/5, PF 5/5                    RIGHT LE - HF 5/5, KE 5/5, DF 5/5, PF 5/5        Sensory - Intact to LT     Reflexes - DTR Intact, No primitive reflexive     Coordination - FTN intact     OculoVestibular - No saccades, No nystagmus, VOR         Balance - WNL Static  Psychiatric - Mood stable, Affect WNL  ----------------------------------------------------------------------------------------  ASSESSMENT/PLAN    Pain -  DVT PPX -   Rehab -     incomplete, consult in progress Patient is a 53y old  Male who presents with a chief complaint of dizziness, n/v (03 Nov 2024 07:47)      HPI:  53 year old PMH HTN, CVA (?residual gait abnormality) on aspirin presents c/o nausea, NBNB vomiting, and dizziness 30 minutes after eating African food last night at 6pm since resolved. In the ED, patient was noted to have marked left beating nystagmus and code stroke was called. Patient denies focal weakness, HA, or visual changes. CTH without acute bleed or infarct shows periventricular and subcortical white matter hypodensities, consistent with microvascular type changes. Left occipital lobe encephalomalacia. CTP diagnostic quality w/   35 mL in the left occipital lobe/PCA territory, no core infarct. CTA w/ mild athero b/l ICA in neck and siphons, no stenosis. Left ICA supraclinoid mod stenosis. Prox left PCA severe stenosis vs partial occlusion. Left vert hypoplastic. Question if left SCA also involved. In the ED neurology evaluated and patient was loaded with ASA and plavix. (31 Oct 2024 12:28)    MRI findings demonstrated chronic b/l hemorrhages basal ganglia, brainstem, cerebellum.    On visitation today, the patient remarked feeling improvement in UE and LE weakness bilaterally on initial presentation, though still noting some weakness in his LE bilaterally. Patient mentioned that he has been having R peripheral visual field deficits for several months, prior to feeling any motor weakness. He notes that he is able to walk to the restroom independently slowly, denies sensory deficits or coordination issues. Remarks having some R auditory loss after the MRI scan. Denies any acute complaints this morning.    [facility] [place]  [id number]    REVIEW OF SYSTEMS  Constitutional - No fever, No weight loss, No fatigue  HEENT - No eye pain, No visual disturbances, No difficulty hearing, No tinnitus, No vertigo, No neck pain  Respiratory - No cough, No wheezing, No shortness of breath  Cardiovascular - No chest pain  Gastrointestinal - No abdominal pain, No nausea, No vomiting, No diarrhea, No constipation  Neurological - No headaches, improvement in strength, No numbness  Skin - No itching, No rashes, No lesions   Musculoskeletal - No joint pain, No muscle pain    PAST MEDICAL & SURGICAL HISTORY  Stroke  HTN (hypertension)  No significant past surgical history      SOCIAL HISTORY  Smoking - Denied  EtOH - Denied   Drugs - Denied    FUNCTIONAL HISTORY  Lives with wife, (with wife's son and child's father)  in a basement apartment  15 steps to enter  Independent at baseline    CURRENT FUNCTIONAL STATUS  PT 11/1  Chart reviewed and content noted. Pt received semi-supine in bed, +telemetry, +IV, all lines intact, in NAD. Pt was able to perform sit<>stand with contact guard assist using rolling walker, required minimal assist x1 using rolling walker to ambulate 25 feet. Pt noted to have left-sided neglect throughout session while performing functional mobility. Pt returned to room, left seated at edge of bed, all lines intact in NAD. GAYLA garcia       FAMILY HISTORY   No pertinent family history in first degree relatives        RECENT LABS/IMAGING  < from: MR Head w/ IV Cont (11.01.24 @ 08:42) >    ACC: [id number] EXAM:  MR BRAIN IC   ORDERED BY: FRANCY LEIJA     ACC: [id number] EXAM:  MR ANGIO NECK IC   ORDERED BY: FRANCY LEIJA     ACC: [id number] EXAM:  MR ANGIO BRAIN   ORDERED BY: FRANCY LEIJA     PROCEDURE DATE:  11/01/2024          INTERPRETATION:  CLINICAL INFORMATION: pca stenosis. LMR. Admitting Dxs:   I63.531 I63.531. Stroke.    TECHNIQUE:  Multiplanar, multisequence brain MRI was performed.  MRA head. 3-D reformats were obtained.  MRA neck. 3-D reformats were obtained.    CONTRAST:Gadavist: 8 cc administered, 2 cc discarded.    COMPARISON: CT head, CT perfusion and CTA head and neck 10/31/2024.    FINDINGS:    Brain MRI:    There is no evidence of acute infarct. Chronic left occipital infarct.   Chronic lacunar infarcts in thebilateral periventricular white matter,   right basal ganglia, left thalamus, ramila and bilateral cerebellar   hemispheres. Multiple foci of susceptibility artifact in the bilateral   cerebral hemispheres, basal ganglia, brainstem and cerebellum suggesting   chronic hemorrhages. Scattered foci of T2/FLAIR hyperintensity in the   bilateral hemispheric white matter are nonspecific but likely related to   chronic white matter microvascular ischemic disease. The ventricles are   normal in size for patient's age.    The paranasal sinuses demonstrate no signal abnormality. The mastoids   demonstrate no signal abnormality.  Bilateral orbits are within normal   limits.    Brain MRA:    There is flow-related signal in the bilateral intradural internal   carotid, anterior cerebral and middle cerebral arteries.    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 Patent   right vertebral and basilar arteries. Diminutive caliber of the left P1   segment, with no flow-related signal in the left PCA more distally   suggesting occlusion or high-grade stenosis. Hypoplastic right P1 segment.    A 2 mm inferiorly directed outpouching arising from the left supraclinoid   ICA suggesting an infundibulum versus aneurysm (1003:26). Tiny aneurysms   can be beyond the resolution of MRA technique. No evidence of   arteriovenous malformation.    Neck MRA:    There is flow-related signal in the bilateral common carotid and cervical   internal carotid arteries. No hemodynamically significant stenosis of the   bilateral cervical ICAs by NASCET criteria.    There is flow-related signal in the bilateral vertebral arteries.   Dominant right and hypoplastic left vertebral arteries.      IMPRESSION:    Brain MRI:  1.  No acute infarct.  2.  Multiple foci of susceptibility artifact in the bilateral cerebral   hemispheres, basal ganglia, brainstem and cerebellum suggesting chronic   hemorrhages.    Brain MRA:  1.  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   Patent right vertebral and basilar arteries.  2.  Diminutive caliber of the left P1 segment, with no flow-related   signal in the left PCA more distally suggesting occlusion or high-grade   stenosis.  3.  A 2 mm inferiorly directed outpouching arising from the left   supraclinoid ICA suggesting an infundibulum versus aneurysm (1003:26).    Neck MRA: No hemodynamically significant stenosis of the bilateral   cervical ICAs by NASCET criteria.    --- End of Report ---            ZACK LOBO MD; Attending Radiologist  This document has been electronically signed. Nov 1 2024  9:34AM    < end of copied text >    CBC Full  -  ( 04 Nov 2024 04:26 )  WBC Count : 7.96 K/uL  RBC Count : 6.35 M/uL  Hemoglobin : 14.1 g/dL  Hematocrit : 42.7 %  Platelet Count - Automated : 238 K/uL  Mean Cell Volume : 67.2 fL  Mean Cell Hemoglobin : 22.2 pg  Mean Cell Hemoglobin Concentration : 33.0 g/dL  Auto Neutrophil # : x  Auto Lymphocyte # : x  Auto Monocyte # : x  Auto Eosinophil # : x  Auto Basophil # : x  Auto Neutrophil % : x  Auto Lymphocyte % : x  Auto Monocyte % : x  Auto Eosinophil % : x  Auto Basophil % : x    11-04    137  |  100  |  22  ----------------------------<  84  3.9   |  22  |  1.33[H]    Ca    9.6      04 Nov 2024 04:26  Phos  3.0     11-04  Mg     1.90     11-04      Urinalysis Basic - ( 04 Nov 2024 04:26 )    Color: x / Appearance: x / SG: x / pH: x  Gluc: 84 mg/dL / Ketone: x  / Bili: x / Urobili: x   Blood: x / Protein: x / Nitrite: x   Leuk Esterase: x / RBC: x / WBC x   Sq Epi: x / Non Sq Epi: x / Bacteria: x        VITALS  T(C): 37.1 (11-04-24 @ 08:43), Max: 37.1 (11-04-24 @ 08:43)  HR: 69 (11-04-24 @ 08:43) (61 - 75)  BP: 143/98 (11-04-24 @ 08:43) (130/97 - 155/97)  RR: 17 (11-04-24 @ 08:43) (17 - 18)  SpO2: 100% (11-04-24 @ 08:43) (100% - 100%)  Wt(kg): --    ALLERGIES  No Known Allergies      MEDICATIONS   acetaminophen     Tablet .. 650 milliGRAM(s) Oral every 6 hours PRN  amLODIPine   Tablet 10 milliGRAM(s) Oral daily  aspirin  chewable 81 milliGRAM(s) Oral daily  atorvastatin 80 milliGRAM(s) Oral every 24 hours  heparin   Injectable 5000 Unit(s) SubCutaneous every 8 hours  influenza   Vaccine 0.5 milliLiter(s) IntraMuscular once  losartan 50 milliGRAM(s) Oral daily  ondansetron Injectable 4 milliGRAM(s) IV Push every 6 hours PRN      ----------------------------------------------------------------------------------------  PHYSICAL EXAM  Constitutional - NAD, Comfortable  HEENT - NCAT, EOMI  Neck - Supple, No limited ROM  Cardiovascular - UE pulses appreciate  Extremities - No C/C/E, No calf tenderness   Neurologic Exam -                    Cognitive - Awake, Alert, AAO to self, place, date, year, situation     Communication - Fluent, No dysarthria     Cranial Nerves - R Lateral visual field EOMI, Facial sensation equal throughout (V1-V3), no facial droop, CN 9-12 intact. CN 8 not assessed.     Motor - No focal deficits                    LEFT    UE - ShAB 5/5, EF 5/5, EE 5/5, WE 5/5,  5/5                    RIGHT UE - ShAB 5/5, EF 5/5, EE 5/5, WE 5/5,  5/5                    LEFT    LE - HF 5/5, KE 5/5, DF 5/5, PF 5/5                    RIGHT LE - HF 5/5, KE 5/5, DF 5/5, PF 5/5        Sensory - Intact to LT     Reflexes - DTR Intact, No primitive reflexive     Coordination - FTN intact     OculoVestibular - horizontal, gaze-evoked nystagmus bilaterally noted on examination, primary gaze intact.      Balance - WNL Static  Psychiatric - Mood stable, Affect WNL  ----------------------------------------------------------------------------------------  ASSESSMENT/PLAN    Pain -  DVT PPX -   Rehab -     incomplete, consult in progress Patient is a 53y old  Male who presents with a chief complaint of dizziness, n/v (03 Nov 2024 07:47)      HPI:  53 year old PMH HTN, CVA (?residual gait abnormality) on aspirin presents c/o nausea, NBNB vomiting, and dizziness 30 minutes after eating African food last night at 6pm since resolved. In the ED, patient was noted to have marked left beating nystagmus and code stroke was called. Patient denies focal weakness, HA, or visual changes. CTH without acute bleed or infarct shows periventricular and subcortical white matter hypodensities, consistent with microvascular type changes. Left occipital lobe encephalomalacia. CTP diagnostic quality w/   35 mL in the left occipital lobe/PCA territory, no core infarct. CTA w/ mild athero b/l ICA in neck and siphons, no stenosis. Left ICA supraclinoid mod stenosis. Prox left PCA severe stenosis vs partial occlusion. Left vert hypoplastic. Question if left SCA also involved. In the ED neurology evaluated and patient was loaded with ASA and plavix. (31 Oct 2024 12:28)    MRI findings demonstrated chronic b/l hemorrhages basal ganglia, brainstem, cerebellum.    On visitation today, the patient remarked feeling improvement in UE and LE weakness bilaterally on initial presentation, though still noting some weakness in his LE bilaterally. Patient mentioned that he has been having R peripheral visual field deficits for several months, prior to feeling any motor weakness. He notes that he is able to walk to the restroom independently slowly, denies sensory deficits or coordination issues. Remarks having some R auditory loss after the MRI scan. Denies any acute complaints this morning.    [facility] [place]  [id number]    REVIEW OF SYSTEMS  Constitutional - No fever, No weight loss, No fatigue  HEENT - No eye pain, + peripheral visual disturbances, No difficulty hearing, No tinnitus, No vertigo, No neck pain  Respiratory - No cough, No wheezing, No shortness of breath  Cardiovascular - No chest pain  Gastrointestinal - No abdominal pain, No nausea, No vomiting, No diarrhea, No constipation  Neurological - No headaches, improvement in strength, No numbness  Skin - No itching, No rashes, No lesions   Musculoskeletal - No joint pain, No muscle pain    PAST MEDICAL & SURGICAL HISTORY  Stroke  HTN (hypertension)  No significant past surgical history      SOCIAL HISTORY  Smoking - Denied  EtOH - Denied   Drugs - Denied    FUNCTIONAL HISTORY  Lives with wife, (with wife's son and child's father)  in a basement apartment  15 steps to enter  Independent at baseline  works as a     CURRENT FUNCTIONAL STATUS  PT 11/1  Chart reviewed and content noted. Pt received semi-supine in bed, +telemetry, +IV, all lines intact, in NAD. Pt was able to perform sit<>stand with contact guard assist using rolling walker, required minimal assist x1 using rolling walker to ambulate 25 feet. Pt noted to have left-sided neglect throughout session while performing functional mobility. Pt returned to room, left seated at edge of bed, all lines intact in NAD. GAYLA Escalante aware       FAMILY HISTORY   No pertinent family history in first degree relatives        RECENT LABS/IMAGING  < from: MR Head w/ IV Cont (11.01.24 @ 08:42) >    ACC: [id number] EXAM:  MR BRAIN IC   ORDERED BY: FRANCY LEIJA     ACC: [id number] EXAM:  MR ANGIO NECK IC   ORDERED BY: FRANCY LEIJA     ACC: [id number] EXAM:  MR ANGIO BRAIN   ORDERED BY: FRANCY LEIJA     PROCEDURE DATE:  11/01/2024          INTERPRETATION:  CLINICAL INFORMATION: pca stenosis. LMR. Admitting Dxs:   I63.531 I63.531. Stroke.    TECHNIQUE:  Multiplanar, multisequence brain MRI was performed.  MRA head. 3-D reformats were obtained.  MRA neck. 3-D reformats were obtained.    CONTRAST:Gadavist: 8 cc administered, 2 cc discarded.    COMPARISON: CT head, CT perfusion and CTA head and neck 10/31/2024.    FINDINGS:    Brain MRI:    There is no evidence of acute infarct. Chronic left occipital infarct.   Chronic lacunar infarcts in thebilateral periventricular white matter,   right basal ganglia, left thalamus, ramila and bilateral cerebellar   hemispheres. Multiple foci of susceptibility artifact in the bilateral   cerebral hemispheres, basal ganglia, brainstem and cerebellum suggesting   chronic hemorrhages. Scattered foci of T2/FLAIR hyperintensity in the   bilateral hemispheric white matter are nonspecific but likely related to   chronic white matter microvascular ischemic disease. The ventricles are   normal in size for patient's age.    The paranasal sinuses demonstrate no signal abnormality. The mastoids   demonstrate no signal abnormality.  Bilateral orbits are within normal   limits.    Brain MRA:    There is flow-related signal in the bilateral intradural internal   carotid, anterior cerebral and middle cerebral arteries.    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 Patent   right vertebral and basilar arteries. Diminutive caliber of the left P1   segment, with no flow-related signal in the left PCA more distally   suggesting occlusion or high-grade stenosis. Hypoplastic right P1 segment.    A 2 mm inferiorly directed outpouching arising from the left supraclinoid   ICA suggesting an infundibulum versus aneurysm (1003:26). Tiny aneurysms   can be beyond the resolution of MRA technique. No evidence of   arteriovenous malformation.    Neck MRA:    There is flow-related signal in the bilateral common carotid and cervical   internal carotid arteries. No hemodynamically significant stenosis of the   bilateral cervical ICAs by NASCET criteria.    There is flow-related signal in the bilateral vertebral arteries.   Dominant right and hypoplastic left vertebral arteries.      IMPRESSION:    Brain MRI:  1.  No acute infarct.  2.  Multiple foci of susceptibility artifact in the bilateral cerebral   hemispheres, basal ganglia, brainstem and cerebellum suggesting chronic   hemorrhages.    Brain MRA:  1.  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   Patent right vertebral and basilar arteries.  2.  Diminutive caliber of the left P1 segment, with no flow-related   signal in the left PCA more distally suggesting occlusion or high-grade   stenosis.  3.  A 2 mm inferiorly directed outpouching arising from the left   supraclinoid ICA suggesting an infundibulum versus aneurysm (1003:26).    Neck MRA: No hemodynamically significant stenosis of the bilateral   cervical ICAs by NASCET criteria.    --- End of Report ---            ZACK LOBO MD; Attending Radiologist  This document has been electronically signed. Nov 1 2024  9:34AM    < end of copied text >    CBC Full  -  ( 04 Nov 2024 04:26 )  WBC Count : 7.96 K/uL  RBC Count : 6.35 M/uL  Hemoglobin : 14.1 g/dL  Hematocrit : 42.7 %  Platelet Count - Automated : 238 K/uL  Mean Cell Volume : 67.2 fL  Mean Cell Hemoglobin : 22.2 pg  Mean Cell Hemoglobin Concentration : 33.0 g/dL  Auto Neutrophil # : x  Auto Lymphocyte # : x  Auto Monocyte # : x  Auto Eosinophil # : x  Auto Basophil # : x  Auto Neutrophil % : x  Auto Lymphocyte % : x  Auto Monocyte % : x  Auto Eosinophil % : x  Auto Basophil % : x    11-04    137  |  100  |  22  ----------------------------<  84  3.9   |  22  |  1.33[H]    Ca    9.6      04 Nov 2024 04:26  Phos  3.0     11-04  Mg     1.90     11-04      Urinalysis Basic - ( 04 Nov 2024 04:26 )    Color: x / Appearance: x / SG: x / pH: x  Gluc: 84 mg/dL / Ketone: x  / Bili: x / Urobili: x   Blood: x / Protein: x / Nitrite: x   Leuk Esterase: x / RBC: x / WBC x   Sq Epi: x / Non Sq Epi: x / Bacteria: x        VITALS  T(C): 37.1 (11-04-24 @ 08:43), Max: 37.1 (11-04-24 @ 08:43)  HR: 69 (11-04-24 @ 08:43) (61 - 75)  BP: 143/98 (11-04-24 @ 08:43) (130/97 - 155/97)  RR: 17 (11-04-24 @ 08:43) (17 - 18)  SpO2: 100% (11-04-24 @ 08:43) (100% - 100%)  Wt(kg): --    ALLERGIES  No Known Allergies      MEDICATIONS   acetaminophen     Tablet .. 650 milliGRAM(s) Oral every 6 hours PRN  amLODIPine   Tablet 10 milliGRAM(s) Oral daily  aspirin  chewable 81 milliGRAM(s) Oral daily  atorvastatin 80 milliGRAM(s) Oral every 24 hours  heparin   Injectable 5000 Unit(s) SubCutaneous every 8 hours  influenza   Vaccine 0.5 milliLiter(s) IntraMuscular once  losartan 50 milliGRAM(s) Oral daily  ondansetron Injectable 4 milliGRAM(s) IV Push every 6 hours PRN      ----------------------------------------------------------------------------------------  PHYSICAL EXAM  Constitutional - NAD, Comfortable  HEENT - NCAT, EOMI  Neck - Supple, No limited ROM  Cardiovascular - UE pulses appreciate  Extremities - No C/C/E, No calf tenderness   Neurologic Exam -                    Cognitive - Awake, Alert, AAO to self, place, date, year, situation     Communication - Fluent, No dysarthria     Cranial Nerves - R Lateral visual field EOMI, Facial sensation equal throughout (V1-V3), no facial droop, CN 9-12 intact. CN 8 not assessed.     Motor - No focal deficits                    LEFT    UE - ShAB 5/5, EF 5/5, EE 5/5, WE 5/5,  5/5                    RIGHT UE - ShAB 5/5, EF 5/5, EE 5/5, WE 5/5,  5/5, 4+/5 R finger abduction                    LEFT    LE - HF 5/5, KE 5/5, DF 5/5, PF 5/5                    RIGHT LE - HF 5/5, KE 5/5, DF 5/5, PF 5/5        Sensory - Intact to LT     Reflexes - DTR Intact at BR     Coordination - FTN intact     OculoVestibular - horizontal, gaze-evoked nystagmus bilaterally noted on examination, primary gaze intact.      Balance - WNL Static  romberg neg  Psychiatric - Mood stable, Affect WNL  ----------------------------------------------------------------------------------------  ASSESSMENT/PLAN   53 year old m pmh cva presented with nausea/vomiting and dizziness, difficulty with gait, found to have chronic hemorrhages in b/l cerebral hemispheres, basal ganglia, brainstem and cerebellum.  patient reports he had difficulty walking which led to admission, function improving, now able to walk to the bathroom on his own.  seen by PT today 11/4 note pending, patient ambulated 100' with RW and climbed stairs  Pain -denies  DVT PPX -heparin  continue PT and OT  Rehab - improving with bedside therapy, recommend home with RW and outpatient PT and OT when medically cleared

## 2024-11-04 NOTE — DISCHARGE NOTE PROVIDER - NSDCFUADDAPPT_GEN_ALL_CORE_FT
Follow up with neurology at Patient can follow up with general neurology at [address] [place] [address]. Please instruct the patient to call [phone] to schedule this appointment or Follow up with [facility], located in [address] [place] [address] at [address] [place] [address]. Patient/family can call [phone] to schedule this appointment.

## 2024-11-04 NOTE — DISCHARGE NOTE PROVIDER - CARE PROVIDER_API CALL
Apple Sanches  Radiology  [address] [facility], [address]  [place] [address]  Phone: [phone]  Fax: [phone]  Follow Up Time:

## 2024-11-04 NOTE — DISCHARGE NOTE PROVIDER - NSDCCPCAREPLAN_GEN_ALL_CORE_FT
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 Please instruct the patient to call [phone] to schedule this appointment or Follow up with [facility], located in [address] [place] [address] at [address] [place] [address]. Patient/family can call [phone] to schedule this appointment.      SECONDARY DISCHARGE DIAGNOSES  Diagnosis: Cerebral aneurysm  Assessment and Plan of Treatment: An abnormality was seen on your internal carotid artery.  Follow up with neurosurgeon Dr Sanches.     PRINCIPAL DISCHARGE DIAGNOSIS  Diagnosis: Vertigo  Assessment and Plan of Treatment: Continue your medications and follow up with neurology and physical tehrapy from home.Patient can follow up with general neurology at [address] [place] [address]. Please instruct the patient to call [phone] to schedule this appointment or Follow up with [facility], located in [address] [place] [address] at [address] [place] [address]. Patient/family can call [phone] to schedule this appointment.      SECONDARY DISCHARGE DIAGNOSES  Diagnosis: Cerebral aneurysm  Assessment and Plan of Treatment: An abnormality was seen on your internal carotid artery.  Follow up with neurosurgeon Dr Sanches.

## 2024-11-04 NOTE — DISCHARGE NOTE NURSING/CASE MANAGEMENT/SOCIAL WORK - PATIENT PORTAL LINK FT
You can access the FollowMyHealth Patient Portal offered by [facility] by registering at the following website: [email or web address]. By joining [facility]’s FollowMyHealth portal, you will also be able to view your health information using other applications (apps) compatible with our system.

## 2024-11-04 NOTE — DISCHARGE NOTE PROVIDER - ATTENDING DISCHARGE PHYSICAL EXAMINATION:
Vital Signs Last 24 Hrs  T(C): 36.8 (04 Nov 2024 12:10), Max: 37.1 (04 Nov 2024 08:43)  T(F): 98.2 (04 Nov 2024 12:10), Max: 98.8 (04 Nov 2024 08:43)  HR: 78 (04 Nov 2024 12:10) (61 - 78)  BP: 137/100 (04 Nov 2024 12:10) (130/97 - 155/97)  BP(mean): --  RR: 18 (04 Nov 2024 12:10) (17 - 18)  SpO2: 100% (04 Nov 2024 12:10) (100% - 100%)    Parameters below as of 04 Nov 2024 12:10  Patient On (Oxygen Delivery Method): room air    GENERAL: NAD  EYES: EOMI, conjunctiva and sclera clear  NECK: Supple, No JVD  CHEST/LUNG: Clear to auscultation bilaterally; No wheeze  HEART: Regular rate and rhythm; No murmurs, rubs, or gallops  ABDOMEN: Soft, Nontender, Nondistended; Bowel sounds present  EXTREMITIES:  2+ Peripheral Pulses, No clubbing, cyanosis, or edema  NEUROLOGY: non-focal  SKIN: No rashes or lesions

## 2024-11-04 NOTE — DISCHARGE NOTE PROVIDER - NSDCMRMEDTOKEN_GEN_ALL_CORE_FT
amLODIPine 10 mg oral tablet: 1 tab(s) orally once a day  Lipitor 40 mg oral tablet: 1 tab(s) orally once a day  losartan 25 mg oral tablet: 1 tab(s) orally once a day   amLODIPine 10 mg oral tablet: 1 tab(s) orally once a day  aspirin 81 mg oral tablet, chewable: 1 tab(s) orally once a day  atorvastatin 80 mg oral tablet: 1 tab(s) orally every 24 hours  losartan 50 mg oral tablet: 1 tab(s) orally once a day   amLODIPine 10 mg oral tablet: 1 tab(s) orally once a day  aspirin 81 mg oral tablet, chewable: 1 tab(s) orally once a day  atorvastatin 80 mg oral tablet: 1 tab(s) orally every 24 hours  losartan 50 mg oral tablet: 1 tab(s) orally once a day  Outpatient Physical Therapy: 3 times a week x 6 weeks  Rolling walker: ICD 10: I63.9, I67.1

## 2024-11-04 NOTE — PROVIDER CONTACT NOTE (OTHER) - BACKGROUND
52 y/o M PMHx HTN, CVA p/w nausea, vomiting and dizziness starting 30 minutes after eating African food.
Patient admitted for code stroke - no permissive htn orders noted
Patient admitted for code stroke

## 2024-11-04 NOTE — DISCHARGE NOTE PROVIDER - HOSPITAL COURSE
Admission HPI: 53 year old PMH HTN, CVA (?residual gait abnormality) on aspirin presents c/o nausea, NBNB vomiting, and dizziness 30 minutes after eating African food last night at 6pm since resolved. In the ED, patient was noted to have marked left beating nystagmus and code stroke was called. Patient denies focal weakness, HA, or visual changes. CTH without acute bleed or infarct shows periventricular and subcortical white matter hypodensities, consistent with microvascular type changes. Left occipital lobe encephalomalacia. CTP diagnostic quality w/   35 mL in the left occipital lobe/PCA territory, no core infarct. CTA w/ mild athero b/l ICA in neck and siphons, no stenosis. Left ICA supraclinoid mod stenosis. Prox left PCA severe stenosis vs partial occlusion. Left vert hypoplastic. Question if left SCA also involved. In the ED neurology evaluated and patient was loaded with ASA and plavix.     Hospital course: Neurosurgery was consulted to evaulate an aneurysm seen on L ICA, no intervention recommended, outpatient follow up with Dr Sanches.  Neurology recommended vestibular rehab and outpatient follow up.  PT and PM&R evaluated patient and recommended outpatient PT.  The patient was stable for discharge.

## 2024-11-04 NOTE — DISCHARGE NOTE NURSING/CASE MANAGEMENT/SOCIAL WORK - NSDCFUADDAPPT_GEN_ALL_CORE_FT
Follow up with neurology at Patient can follow up with general neurology at [address] [place] [address]. Please instruct the patient to call [phone] to schedule this appointment or Follow up with [facility], located in [address] [place] [address] at [address] [place] [address]. Patient/family can call [phone] to schedule this appointment.

## 2024-11-04 NOTE — PROVIDER CONTACT NOTE (OTHER) - REASON
Elevated blood pressure
patient complained of hearing loss in right ear
Patient's BP is 154/110. Patient is in a permissive hypertension state. Patient is asymptomatic.

## 2024-11-04 NOTE — PROVIDER CONTACT NOTE (OTHER) - ACTION/TREATMENT ORDERED:
Ordering permissive htn parameters.
provider assessed patient bedside, ACP contacted neuro, no new orders at this time
Per ACP blood pressure is ok for now.

## 2024-11-04 NOTE — DISCHARGE NOTE NURSING/CASE MANAGEMENT/SOCIAL WORK - FINANCIAL ASSISTANCE
[facility] provides services at a reduced cost to those who are determined to be eligible through [facility]’s financial assistance program. Information regarding [facility]’s financial assistance program can be found by going to [email or web address] or by calling [phone].

## 2024-11-04 NOTE — PROVIDER CONTACT NOTE (OTHER) - SITUATION
patient stated that he has had hearing loss in the right ear since 11/2 after MRI. According to patient day RN was told. patient did not complain of hearing loss to night nurse on 11/2
Patient BP elevated, 154/112, HR 77
Patient's BP is 154/110. Patient is in a permissive hypertension state. Patient is asymptomatic.

## 2024-11-07 ENCOUNTER — APPOINTMENT (OUTPATIENT)
Dept: INTERNAL MEDICINE | Facility: CLINIC | Age: 54
End: 2024-11-07
Payer: MEDICAID

## 2024-11-07 ENCOUNTER — OUTPATIENT (OUTPATIENT)
Dept: OUTPATIENT SERVICES | Facility: HOSPITAL | Age: 54
LOS: 1 days | End: 2024-11-07

## 2024-11-07 VITALS
SYSTOLIC BLOOD PRESSURE: 136 MMHG | RESPIRATION RATE: 16 BRPM | HEIGHT: 66 IN | OXYGEN SATURATION: 99 % | HEART RATE: 82 BPM | WEIGHT: 207.5 LBS | BODY MASS INDEX: 33.35 KG/M2 | TEMPERATURE: 97.6 F | DIASTOLIC BLOOD PRESSURE: 82 MMHG

## 2024-11-07 VITALS — DIASTOLIC BLOOD PRESSURE: 95 MMHG | SYSTOLIC BLOOD PRESSURE: 130 MMHG

## 2024-11-07 DIAGNOSIS — I67.1 CEREBRAL ANEURYSM, NONRUPTURED: ICD-10-CM

## 2024-11-07 DIAGNOSIS — I10 ESSENTIAL (PRIMARY) HYPERTENSION: ICD-10-CM

## 2024-11-07 DIAGNOSIS — R71.8 OTHER ABNORMALITY OF RED BLOOD CELLS: ICD-10-CM

## 2024-11-07 DIAGNOSIS — Z86.79 PERSONAL HISTORY OF OTHER DISEASES OF THE CIRCULATORY SYSTEM: ICD-10-CM

## 2024-11-07 DIAGNOSIS — E78.5 HYPERLIPIDEMIA, UNSPECIFIED: ICD-10-CM

## 2024-11-07 DIAGNOSIS — Z12.11 ENCOUNTER FOR SCREENING FOR MALIGNANT NEOPLASM OF COLON: ICD-10-CM

## 2024-11-07 DIAGNOSIS — I63.9 CEREBRAL INFARCTION, UNSPECIFIED: ICD-10-CM

## 2024-11-07 PROCEDURE — 99213 OFFICE O/P EST LOW 20 MIN: CPT | Mod: 25

## 2024-11-07 RX ORDER — LOSARTAN POTASSIUM 100 MG/1
100 TABLET, FILM COATED ORAL DAILY
Qty: 1 | Refills: 3 | Status: ACTIVE | COMMUNITY
Start: 2024-11-07 | End: 1900-01-01

## 2024-11-07 RX ORDER — ATORVASTATIN CALCIUM 80 MG/1
80 TABLET, FILM COATED ORAL
Qty: 30 | Refills: 3 | Status: ACTIVE | COMMUNITY
Start: 2024-11-07

## 2024-11-07 RX ORDER — AMLODIPINE BESYLATE 10 MG/1
10 TABLET ORAL DAILY
Refills: 0 | Status: ACTIVE | COMMUNITY
Start: 2024-11-07

## 2024-11-07 RX ORDER — LOSARTAN POTASSIUM 50 MG/1
50 TABLET, FILM COATED ORAL DAILY
Qty: 30 | Refills: 3 | Status: DISCONTINUED | COMMUNITY
Start: 2024-11-07 | End: 2024-11-07

## 2024-11-08 DIAGNOSIS — I67.1 CEREBRAL ANEURYSM, NONRUPTURED: ICD-10-CM

## 2024-11-08 DIAGNOSIS — R71.8 OTHER ABNORMALITY OF RED BLOOD CELLS: ICD-10-CM

## 2024-11-08 DIAGNOSIS — E78.5 HYPERLIPIDEMIA, UNSPECIFIED: ICD-10-CM

## 2024-11-08 DIAGNOSIS — I63.9 CEREBRAL INFARCTION, UNSPECIFIED: ICD-10-CM

## 2024-11-08 DIAGNOSIS — I10 ESSENTIAL (PRIMARY) HYPERTENSION: ICD-10-CM

## 2024-11-08 DIAGNOSIS — Z12.11 ENCOUNTER FOR SCREENING FOR MALIGNANT NEOPLASM OF COLON: ICD-10-CM

## 2024-11-11 LAB
HGB A MFR BLD: 94.3 %
HGB A2 MFR BLD: 5.7 %
HGB FRACT BLD-IMP: NORMAL

## 2024-11-20 ENCOUNTER — OUTPATIENT (OUTPATIENT)
Dept: OUTPATIENT SERVICES | Facility: HOSPITAL | Age: 54
LOS: 1 days | End: 2024-11-20

## 2024-11-20 ENCOUNTER — APPOINTMENT (OUTPATIENT)
Dept: INTERNAL MEDICINE | Facility: CLINIC | Age: 54
End: 2024-11-20

## 2024-11-20 VITALS
BODY MASS INDEX: 33.43 KG/M2 | SYSTOLIC BLOOD PRESSURE: 146 MMHG | RESPIRATION RATE: 17 BRPM | WEIGHT: 208 LBS | OXYGEN SATURATION: 99 % | DIASTOLIC BLOOD PRESSURE: 96 MMHG | HEART RATE: 80 BPM | HEIGHT: 66 IN

## 2024-11-20 VITALS — DIASTOLIC BLOOD PRESSURE: 76 MMHG | SYSTOLIC BLOOD PRESSURE: 132 MMHG

## 2024-11-20 DIAGNOSIS — G89.29 LOW BACK PAIN, UNSPECIFIED: ICD-10-CM

## 2024-11-20 DIAGNOSIS — E78.5 HYPERLIPIDEMIA, UNSPECIFIED: ICD-10-CM

## 2024-11-20 DIAGNOSIS — I10 ESSENTIAL (PRIMARY) HYPERTENSION: ICD-10-CM

## 2024-11-20 DIAGNOSIS — H91.91 UNSPECIFIED HEARING LOSS, RIGHT EAR: ICD-10-CM

## 2024-11-20 DIAGNOSIS — M54.50 LOW BACK PAIN, UNSPECIFIED: ICD-10-CM

## 2024-11-20 DIAGNOSIS — I63.9 CEREBRAL INFARCTION, UNSPECIFIED: ICD-10-CM

## 2024-11-20 DIAGNOSIS — R71.8 OTHER ABNORMALITY OF RED BLOOD CELLS: ICD-10-CM

## 2024-11-20 PROCEDURE — 99213 OFFICE O/P EST LOW 20 MIN: CPT

## 2024-11-21 ENCOUNTER — RX RENEWAL (OUTPATIENT)
Age: 54
End: 2024-11-21

## 2024-11-21 PROBLEM — M54.50 CHRONIC LOW BACK PAIN, UNSPECIFIED BACK PAIN LATERALITY, UNSPECIFIED WHETHER SCIATICA PRESENT: Status: ACTIVE | Noted: 2024-11-21

## 2024-11-21 PROBLEM — H91.91 HEARING LOSS, RIGHT: Status: ACTIVE | Noted: 2024-11-20

## 2024-11-26 ENCOUNTER — OUTPATIENT (OUTPATIENT)
Dept: OUTPATIENT SERVICES | Facility: HOSPITAL | Age: 54
LOS: 1 days | End: 2024-11-26
Payer: MEDICAID

## 2024-11-26 ENCOUNTER — APPOINTMENT (OUTPATIENT)
Age: 54
End: 2024-11-26
Payer: MEDICAID

## 2024-11-26 VITALS
BODY MASS INDEX: 33.43 KG/M2 | TEMPERATURE: 97.9 F | DIASTOLIC BLOOD PRESSURE: 91 MMHG | SYSTOLIC BLOOD PRESSURE: 134 MMHG | HEART RATE: 65 BPM | HEIGHT: 66 IN | WEIGHT: 208 LBS | RESPIRATION RATE: 71 BRPM | OXYGEN SATURATION: 97 %

## 2024-11-26 DIAGNOSIS — R41.89 OTHER SYMPTOMS AND SIGNS INVOLVING COGNITIVE FUNCTIONS AND AWARENESS: ICD-10-CM

## 2024-11-26 DIAGNOSIS — I63.9 CEREBRAL INFARCTION, UNSPECIFIED: ICD-10-CM

## 2024-11-26 DIAGNOSIS — R51.9 HEADACHE, UNSPECIFIED: ICD-10-CM

## 2024-11-26 PROCEDURE — 99202 OFFICE O/P NEW SF 15 MIN: CPT

## 2024-11-26 PROCEDURE — G0463: CPT

## 2024-11-26 RX ORDER — LOSARTAN POTASSIUM 50 MG/1
50 TABLET, FILM COATED ORAL DAILY
Refills: 0 | Status: ACTIVE | COMMUNITY
Start: 2024-11-26

## 2024-12-04 PROBLEM — Z00.00 ENCOUNTER FOR PREVENTIVE HEALTH EXAMINATION: Status: ACTIVE | Noted: 2024-12-04

## 2024-12-27 ENCOUNTER — OUTPATIENT (OUTPATIENT)
Dept: OUTPATIENT SERVICES | Facility: HOSPITAL | Age: 54
LOS: 1 days | End: 2024-12-27

## 2024-12-27 ENCOUNTER — APPOINTMENT (OUTPATIENT)
Dept: INTERNAL MEDICINE | Facility: CLINIC | Age: 54
End: 2024-12-27
Payer: MEDICAID

## 2024-12-27 VITALS
OXYGEN SATURATION: 98 % | HEIGHT: 66 IN | HEART RATE: 77 BPM | DIASTOLIC BLOOD PRESSURE: 90 MMHG | BODY MASS INDEX: 33.91 KG/M2 | WEIGHT: 211 LBS | SYSTOLIC BLOOD PRESSURE: 162 MMHG

## 2024-12-27 DIAGNOSIS — I63.9 CEREBRAL INFARCTION, UNSPECIFIED: ICD-10-CM

## 2024-12-27 DIAGNOSIS — I10 ESSENTIAL (PRIMARY) HYPERTENSION: ICD-10-CM

## 2024-12-27 DIAGNOSIS — N52.9 MALE ERECTILE DYSFUNCTION, UNSPECIFIED: ICD-10-CM

## 2024-12-27 PROCEDURE — 99213 OFFICE O/P EST LOW 20 MIN: CPT

## 2024-12-27 RX ORDER — SILDENAFIL 25 MG/1
25 TABLET ORAL
Qty: 1 | Refills: 0 | Status: ACTIVE | COMMUNITY
Start: 2024-12-27 | End: 1900-01-01

## 2024-12-31 DIAGNOSIS — I10 ESSENTIAL (PRIMARY) HYPERTENSION: ICD-10-CM

## 2024-12-31 DIAGNOSIS — N52.9 MALE ERECTILE DYSFUNCTION, UNSPECIFIED: ICD-10-CM

## 2024-12-31 DIAGNOSIS — I63.9 CEREBRAL INFARCTION, UNSPECIFIED: ICD-10-CM

## 2025-01-10 ENCOUNTER — APPOINTMENT (OUTPATIENT)
Dept: OTOLARYNGOLOGY | Facility: CLINIC | Age: 55
End: 2025-01-10
Payer: MEDICAID

## 2025-01-10 VITALS
HEIGHT: 64.96 IN | BODY MASS INDEX: 35.16 KG/M2 | OXYGEN SATURATION: 94 % | HEART RATE: 80 BPM | SYSTOLIC BLOOD PRESSURE: 162 MMHG | DIASTOLIC BLOOD PRESSURE: 103 MMHG | WEIGHT: 211 LBS

## 2025-01-10 DIAGNOSIS — H93.11 TINNITUS, RIGHT EAR: ICD-10-CM

## 2025-01-10 DIAGNOSIS — H93.291 OTHER ABNORMAL AUDITORY PERCEPTIONS, RIGHT EAR: ICD-10-CM

## 2025-01-10 PROCEDURE — 99204 OFFICE O/P NEW MOD 45 MIN: CPT | Mod: 25

## 2025-01-10 PROCEDURE — 92567 TYMPANOMETRY: CPT

## 2025-01-10 PROCEDURE — 92557 COMPREHENSIVE HEARING TEST: CPT

## 2025-01-10 RX ORDER — PREDNISONE 10 MG/1
10 TABLET ORAL
Qty: 95 | Refills: 0 | Status: ACTIVE | COMMUNITY
Start: 2025-01-10 | End: 1900-01-01

## 2025-01-17 ENCOUNTER — APPOINTMENT (OUTPATIENT)
Dept: OTOLARYNGOLOGY | Facility: CLINIC | Age: 55
End: 2025-01-17
Payer: MEDICAID

## 2025-01-17 VITALS
BODY MASS INDEX: 36.02 KG/M2 | HEART RATE: 78 BPM | DIASTOLIC BLOOD PRESSURE: 97 MMHG | HEIGHT: 64 IN | WEIGHT: 211 LBS | OXYGEN SATURATION: 97 % | SYSTOLIC BLOOD PRESSURE: 153 MMHG

## 2025-01-17 DIAGNOSIS — H90.41 SENSORINEURAL HEARING LOSS, UNILATERAL, RIGHT EAR, WITH UNRESTRICTED HEARING ON THE CONTRALATERAL SIDE: ICD-10-CM

## 2025-01-17 PROCEDURE — 92553 AUDIOMETRY AIR & BONE: CPT

## 2025-01-17 PROCEDURE — 92567 TYMPANOMETRY: CPT

## 2025-01-17 PROCEDURE — 99213 OFFICE O/P EST LOW 20 MIN: CPT | Mod: 25

## 2025-01-25 ENCOUNTER — OUTPATIENT (OUTPATIENT)
Dept: OUTPATIENT SERVICES | Facility: HOSPITAL | Age: 55
LOS: 1 days | End: 2025-01-25
Payer: MEDICAID

## 2025-01-25 ENCOUNTER — APPOINTMENT (OUTPATIENT)
Dept: MRI IMAGING | Facility: IMAGING CENTER | Age: 55
End: 2025-01-25
Payer: MEDICAID

## 2025-01-25 DIAGNOSIS — Z00.8 ENCOUNTER FOR OTHER GENERAL EXAMINATION: ICD-10-CM

## 2025-01-25 PROCEDURE — 70551 MRI BRAIN STEM W/O DYE: CPT

## 2025-01-25 PROCEDURE — A9585: CPT

## 2025-01-25 PROCEDURE — 70551 MRI BRAIN STEM W/O DYE: CPT | Mod: 26

## 2025-01-28 ENCOUNTER — NON-APPOINTMENT (OUTPATIENT)
Age: 55
End: 2025-01-28

## 2025-01-30 ENCOUNTER — APPOINTMENT (OUTPATIENT)
Dept: OTOLARYNGOLOGY | Facility: CLINIC | Age: 55
End: 2025-01-30
Payer: MEDICAID

## 2025-01-30 VITALS
SYSTOLIC BLOOD PRESSURE: 154 MMHG | WEIGHT: 211 LBS | DIASTOLIC BLOOD PRESSURE: 97 MMHG | BODY MASS INDEX: 36.02 KG/M2 | HEIGHT: 64 IN | OXYGEN SATURATION: 97 % | HEART RATE: 82 BPM

## 2025-01-30 DIAGNOSIS — H93.11 TINNITUS, RIGHT EAR: ICD-10-CM

## 2025-01-30 DIAGNOSIS — H90.41 SENSORINEURAL HEARING LOSS, UNILATERAL, RIGHT EAR, WITH UNRESTRICTED HEARING ON THE CONTRALATERAL SIDE: ICD-10-CM

## 2025-01-30 PROCEDURE — 92567 TYMPANOMETRY: CPT

## 2025-01-30 PROCEDURE — 92557 COMPREHENSIVE HEARING TEST: CPT

## 2025-01-30 PROCEDURE — 99213 OFFICE O/P EST LOW 20 MIN: CPT | Mod: 25

## 2025-03-11 ENCOUNTER — APPOINTMENT (OUTPATIENT)
Age: 55
End: 2025-03-11
Payer: MEDICAID

## 2025-04-24 ENCOUNTER — APPOINTMENT (OUTPATIENT)
Dept: OTOLARYNGOLOGY | Facility: CLINIC | Age: 55
End: 2025-04-24
Payer: MEDICAID

## 2025-04-24 VITALS
SYSTOLIC BLOOD PRESSURE: 181 MMHG | HEART RATE: 70 BPM | WEIGHT: 211 LBS | OXYGEN SATURATION: 97 % | BODY MASS INDEX: 36.02 KG/M2 | DIASTOLIC BLOOD PRESSURE: 110 MMHG | HEIGHT: 64 IN

## 2025-04-24 VITALS — DIASTOLIC BLOOD PRESSURE: 98 MMHG | SYSTOLIC BLOOD PRESSURE: 157 MMHG

## 2025-04-24 DIAGNOSIS — H90.41 SENSORINEURAL HEARING LOSS, UNILATERAL, RIGHT EAR, WITH UNRESTRICTED HEARING ON THE CONTRALATERAL SIDE: ICD-10-CM

## 2025-04-24 DIAGNOSIS — H61.22 IMPACTED CERUMEN, LEFT EAR: ICD-10-CM

## 2025-04-24 PROCEDURE — 92557 COMPREHENSIVE HEARING TEST: CPT

## 2025-04-24 PROCEDURE — 92567 TYMPANOMETRY: CPT

## 2025-04-24 PROCEDURE — 99213 OFFICE O/P EST LOW 20 MIN: CPT | Mod: 25

## 2025-05-16 ENCOUNTER — APPOINTMENT (OUTPATIENT)
Dept: INTERNAL MEDICINE | Facility: CLINIC | Age: 55
End: 2025-05-16

## 2025-05-16 ENCOUNTER — OUTPATIENT (OUTPATIENT)
Dept: OUTPATIENT SERVICES | Facility: HOSPITAL | Age: 55
LOS: 1 days | End: 2025-05-16

## 2025-05-16 VITALS
WEIGHT: 217 LBS | DIASTOLIC BLOOD PRESSURE: 114 MMHG | BODY MASS INDEX: 37.05 KG/M2 | HEIGHT: 64 IN | OXYGEN SATURATION: 95 % | HEART RATE: 74 BPM | SYSTOLIC BLOOD PRESSURE: 174 MMHG

## 2025-05-16 VITALS — DIASTOLIC BLOOD PRESSURE: 100 MMHG | SYSTOLIC BLOOD PRESSURE: 144 MMHG

## 2025-05-16 DIAGNOSIS — R73.03 PREDIABETES.: ICD-10-CM

## 2025-05-16 DIAGNOSIS — I63.9 CEREBRAL INFARCTION, UNSPECIFIED: ICD-10-CM

## 2025-05-16 DIAGNOSIS — I10 ESSENTIAL (PRIMARY) HYPERTENSION: ICD-10-CM

## 2025-05-16 DIAGNOSIS — R73.03 PREDIABETES: ICD-10-CM

## 2025-05-16 PROCEDURE — 99214 OFFICE O/P EST MOD 30 MIN: CPT

## 2025-05-16 RX ORDER — CHLORTHALIDONE 25 MG/1
25 TABLET ORAL DAILY
Qty: 90 | Refills: 0 | Status: ACTIVE | COMMUNITY
Start: 2025-05-16 | End: 1900-01-01

## 2025-05-19 LAB
ALBUMIN SERPL ELPH-MCNC: 4.7 G/DL
ALP BLD-CCNC: 92 U/L
ALT SERPL-CCNC: 27 U/L
ANION GAP SERPL CALC-SCNC: 13 MMOL/L
AST SERPL-CCNC: 35 U/L
BILIRUB SERPL-MCNC: 0.8 MG/DL
BUN SERPL-MCNC: 16 MG/DL
CALCIUM SERPL-MCNC: 9.8 MG/DL
CHLORIDE SERPL-SCNC: 98 MMOL/L
CHOLEST SERPL-MCNC: 205 MG/DL
CO2 SERPL-SCNC: 28 MMOL/L
CREAT SERPL-MCNC: 1.26 MG/DL
CREAT SPEC-SCNC: 77 MG/DL
EGFRCR SERPLBLD CKD-EPI 2021: 67 ML/MIN/1.73M2
ESTIMATED AVERAGE GLUCOSE: 120 MG/DL
GLUCOSE SERPL-MCNC: 92 MG/DL
HBA1C MFR BLD HPLC: 5.8 %
HCT VFR BLD CALC: 46.5 %
HDLC SERPL-MCNC: 39 MG/DL
HGB BLD-MCNC: 15 G/DL
LDLC SERPL-MCNC: 139 MG/DL
MCHC RBC-ENTMCNC: 22.1 PG
MCHC RBC-ENTMCNC: 32.3 G/DL
MCV RBC AUTO: 68.5 FL
MICROALBUMIN 24H UR DL<=1MG/L-MCNC: 4.3 MG/DL
MICROALBUMIN/CREAT 24H UR-RTO: 56 MG/G
NONHDLC SERPL-MCNC: 167 MG/DL
PLATELET # BLD AUTO: 235 K/UL
POTASSIUM SERPL-SCNC: 3.5 MMOL/L
PROT SERPL-MCNC: 7.9 G/DL
RBC # BLD: 6.79 M/UL
RBC # FLD: 17.8 %
SODIUM SERPL-SCNC: 139 MMOL/L
TRIGL SERPL-MCNC: 153 MG/DL
WBC # FLD AUTO: 5.33 K/UL

## 2025-06-17 ENCOUNTER — OUTPATIENT (OUTPATIENT)
Dept: OUTPATIENT SERVICES | Facility: HOSPITAL | Age: 55
LOS: 1 days | End: 2025-06-17

## 2025-06-17 ENCOUNTER — APPOINTMENT (OUTPATIENT)
Dept: INTERNAL MEDICINE | Facility: CLINIC | Age: 55
End: 2025-06-17
Payer: MEDICAID

## 2025-06-17 VITALS
HEART RATE: 64 BPM | BODY MASS INDEX: 36.54 KG/M2 | OXYGEN SATURATION: 97 % | HEIGHT: 64 IN | WEIGHT: 214 LBS | DIASTOLIC BLOOD PRESSURE: 97 MMHG | SYSTOLIC BLOOD PRESSURE: 155 MMHG

## 2025-06-17 PROCEDURE — G2211 COMPLEX E/M VISIT ADD ON: CPT | Mod: NC

## 2025-06-17 PROCEDURE — 99204 OFFICE O/P NEW MOD 45 MIN: CPT

## 2025-06-17 RX ORDER — ATORVASTATIN CALCIUM 80 MG/1
80 TABLET, FILM COATED ORAL
Qty: 30 | Refills: 3 | Status: ACTIVE | COMMUNITY
Start: 2025-06-17 | End: 1900-01-01

## 2025-06-18 DIAGNOSIS — I63.9 CEREBRAL INFARCTION, UNSPECIFIED: ICD-10-CM

## 2025-06-18 DIAGNOSIS — E78.5 HYPERLIPIDEMIA, UNSPECIFIED: ICD-10-CM

## 2025-06-18 DIAGNOSIS — I10 ESSENTIAL (PRIMARY) HYPERTENSION: ICD-10-CM

## 2025-07-01 ENCOUNTER — APPOINTMENT (OUTPATIENT)
Dept: INTERNAL MEDICINE | Facility: CLINIC | Age: 55
End: 2025-07-01

## 2025-07-22 ENCOUNTER — APPOINTMENT (OUTPATIENT)
Dept: INTERNAL MEDICINE | Facility: CLINIC | Age: 55
End: 2025-07-22

## 2025-07-22 ENCOUNTER — NON-APPOINTMENT (OUTPATIENT)
Age: 55
End: 2025-07-22

## 2025-07-24 ENCOUNTER — APPOINTMENT (OUTPATIENT)
Dept: INTERNAL MEDICINE | Facility: CLINIC | Age: 55
End: 2025-07-24